# Patient Record
Sex: FEMALE | Race: WHITE | Employment: OTHER | URBAN - METROPOLITAN AREA
[De-identification: names, ages, dates, MRNs, and addresses within clinical notes are randomized per-mention and may not be internally consistent; named-entity substitution may affect disease eponyms.]

---

## 2020-07-07 ENCOUNTER — OFFICE VISIT (OUTPATIENT)
Dept: SURGERY | Age: 65
End: 2020-07-07
Payer: MEDICARE

## 2020-07-07 ENCOUNTER — PREP FOR PROCEDURE (OUTPATIENT)
Dept: SURGERY | Age: 65
End: 2020-07-07

## 2020-07-07 VITALS
BODY MASS INDEX: 14.71 KG/M2 | HEART RATE: 75 BPM | TEMPERATURE: 97.1 F | HEIGHT: 63 IN | DIASTOLIC BLOOD PRESSURE: 70 MMHG | WEIGHT: 83 LBS | SYSTOLIC BLOOD PRESSURE: 112 MMHG | RESPIRATION RATE: 16 BRPM

## 2020-07-07 PROCEDURE — G8400 PT W/DXA NO RESULTS DOC: HCPCS | Performed by: SURGERY

## 2020-07-07 PROCEDURE — 1123F ACP DISCUSS/DSCN MKR DOCD: CPT | Performed by: SURGERY

## 2020-07-07 PROCEDURE — 3017F COLORECTAL CA SCREEN DOC REV: CPT | Performed by: SURGERY

## 2020-07-07 PROCEDURE — 1090F PRES/ABSN URINE INCON ASSESS: CPT | Performed by: SURGERY

## 2020-07-07 PROCEDURE — G8419 CALC BMI OUT NRM PARAM NOF/U: HCPCS | Performed by: SURGERY

## 2020-07-07 PROCEDURE — G8427 DOCREV CUR MEDS BY ELIG CLIN: HCPCS | Performed by: SURGERY

## 2020-07-07 PROCEDURE — 99204 OFFICE O/P NEW MOD 45 MIN: CPT | Performed by: SURGERY

## 2020-07-07 PROCEDURE — 1036F TOBACCO NON-USER: CPT | Performed by: SURGERY

## 2020-07-07 PROCEDURE — 4040F PNEUMOC VAC/ADMIN/RCVD: CPT | Performed by: SURGERY

## 2020-07-07 RX ORDER — DICYCLOMINE HYDROCHLORIDE 10 MG/1
CAPSULE ORAL
COMMUNITY
Start: 2020-06-18

## 2020-07-07 RX ORDER — CYANOCOBALAMIN 1000 UG/ML
INJECTION INTRAMUSCULAR; SUBCUTANEOUS
COMMUNITY

## 2020-07-07 RX ORDER — DICYCLOMINE HYDROCHLORIDE 10 MG/5ML
SOLUTION ORAL
COMMUNITY
Start: 2019-11-10 | End: 2020-07-14

## 2020-07-07 RX ORDER — RIZATRIPTAN BENZOATE 5 MG/1
TABLET ORAL
COMMUNITY
Start: 2019-10-30

## 2020-07-07 RX ORDER — FLUOCINONIDE TOPICAL SOLUTION USP, 0.05% 0.5 MG/ML
SOLUTION TOPICAL
COMMUNITY
End: 2020-07-14

## 2020-07-07 RX ORDER — TERIPARATIDE 250 UG/ML
20 INJECTION, SOLUTION SUBCUTANEOUS DAILY
COMMUNITY
Start: 2020-03-18

## 2020-07-07 RX ORDER — CLINDAMYCIN PHOSPHATE 10 UG/ML
LOTION TOPICAL
COMMUNITY
Start: 2020-06-29 | End: 2020-07-14

## 2020-07-07 RX ORDER — OLANZAPINE 2.5 MG/1
TABLET ORAL
COMMUNITY
Start: 2020-04-22 | End: 2020-07-14

## 2020-07-07 RX ORDER — KETOCONAZOLE 20 MG/ML
SHAMPOO TOPICAL
COMMUNITY
Start: 2020-06-28

## 2020-07-07 RX ORDER — ALPRAZOLAM 2 MG/1
TABLET ORAL
COMMUNITY
Start: 2017-02-06

## 2020-07-07 RX ORDER — IBUPROFEN 800 MG/1
800 TABLET ORAL EVERY 8 HOURS PRN
COMMUNITY
Start: 2014-05-12

## 2020-07-07 RX ORDER — SODIUM CHLORIDE 0.9 % (FLUSH) 0.9 %
10 SYRINGE (ML) INJECTION EVERY 12 HOURS SCHEDULED
Status: CANCELLED | OUTPATIENT
Start: 2020-07-07

## 2020-07-07 RX ORDER — AZELASTINE 1 MG/ML
SPRAY, METERED NASAL
COMMUNITY
Start: 2020-02-11

## 2020-07-07 RX ORDER — B-COMPLEX WITH VITAMIN C
100 TABLET ORAL
COMMUNITY

## 2020-07-07 RX ORDER — BUTALBITAL, ACETAMINOPHEN AND CAFFEINE 50; 325; 40 MG/1; MG/1; MG/1
1 CAPSULE ORAL EVERY 6 HOURS PRN
COMMUNITY
Start: 2011-07-03

## 2020-07-07 RX ORDER — TEMAZEPAM 15 MG/1
CAPSULE ORAL
COMMUNITY
Start: 2020-02-11

## 2020-07-07 RX ORDER — KETOCONAZOLE 20 MG/ML
SHAMPOO TOPICAL
COMMUNITY
End: 2020-07-14

## 2020-07-07 RX ORDER — DONEPEZIL HYDROCHLORIDE 10 MG/1
23 TABLET, FILM COATED ORAL DAILY
COMMUNITY
Start: 2019-05-06

## 2020-07-07 RX ORDER — UREA 10 %
800 LOTION (ML) TOPICAL DAILY
COMMUNITY

## 2020-07-07 RX ORDER — ESTRADIOL 0.1 MG/G
CREAM VAGINAL
COMMUNITY
Start: 2019-12-23

## 2020-07-07 RX ORDER — SODIUM CHLORIDE 0.9 % (FLUSH) 0.9 %
10 SYRINGE (ML) INJECTION PRN
Status: CANCELLED | OUTPATIENT
Start: 2020-07-07

## 2020-07-07 RX ORDER — GUAIFENESIN 1200 MG/1
1200 TABLET, EXTENDED RELEASE ORAL EVERY 12 HOURS
COMMUNITY

## 2020-07-07 RX ORDER — CYANOCOBALAMIN 1000 UG/ML
INJECTION INTRAMUSCULAR; SUBCUTANEOUS
COMMUNITY
Start: 2020-02-11 | End: 2020-07-14

## 2020-07-07 RX ORDER — CITALOPRAM 40 MG/1
40 TABLET ORAL DAILY
COMMUNITY

## 2020-07-07 NOTE — LETTER
P - [facility] [address] [place] [address] [phone]  f [phone]    Marques Alatorre MD                        SURGERY ORDER   -- Time of order -- 20    2:36 PM    Facility:   [facility] # _________________                                                                                    Scheduled By:____________                  Surgery Date & Time: 20 @ 7:15 am                    Pt arrival: 5:30 am                                                                                      Patient Name:  Eula Andino     :  1955     PCP:  Evan Ferrara Ph:    [phone] (home)                                                     PROCEDURE:  Exam Under Anesthesia, disimpaction of rectum under anesthesia, flexible sigmoidoscopy  19027, 03213, 42459  DIAGNOSIS:      ICD-10-CM    1. Impacted stool in rectum ([facility]) K56.41    2.  Ileostomy in place [facility]) Z93.2      Anesthesia: _General    Anesthesia: lines, blocks, TAP/epidural, etc: none  Time Needed:  20 minutes    Pt Position:  prone/daryl-knife    Bowel Prep: enema x 2         Outpatient _x__ Admit ___                  Pre-Op to be done by: __N/A___    Cardiac Clearance Done by: ________    Medications to be stopped 5 days before surgery: _________  Additional / Special Orders:                                                                                                                                                                                                          Marques Alatorre MD

## 2020-07-07 NOTE — PROGRESS NOTES
infusions. Patient's problem list, medications, past medical, surgical, family, and social histories were reviewed and updated in the chart as indicated today. History reviewed. No pertinent past medical history. Surgical history reviewed above. Family History: denies family history of colon cancer    Social History:   Social History     Tobacco Use    Smoking status: Never Smoker    Smokeless tobacco: Never Used   Substance Use Topics    Alcohol use: Not on file      Tobacco cessation counseling provided as appropriate. REVIEW OF SYSTEMS:    Pertinent positives and negatives are mentioned in the HPI above. Otherwise, all other systems were reviewed and negative. Objective:     Physical Exam   /70   Pulse 75   Temp 97.1 °F (36.2 °C) (Tympanic)   Resp 16   Ht 5' 3\" (1.6 m)   Wt 83 lb (37.6 kg)   Breastfeeding No   BMI 14.70 kg/m²   Constitutional: Appears well-developed and well-nourished. Grooming appropriate. No gross deformities. Body mass index is 14.7 kg/m². Eyes: No scleral icterus. Conjunctiva/lids normal. Vision intact grossly. Pupils equal/symmetric, reactive bilaterally. ENT: External ears/nose without defect, scars, or masses. Hearing grossly intact. No facial deformity. Lips normal, normal dentition. Neck: No masses. Trachea midline. No crepitus. Thyroid not enlarged. Cardiovascular: Normal rate. No peripheral edema. Abdominal aorta normal size to palpation. Pulmonary/Chest: Effort normal. No respiratory distress. No wheezes. No use of accessory muscles. Musculoskeletal: Normal range of motion x all 4 extremities and head/neck, without deformity, pain, or crepitus, with normal strength and tone. Normal gait. Nails without clubbing or cyanosis. Neurological: Alert and oriented to person, place, and time. No gross deficits. Sensation intact. Skin: Skin is dry. No rashes noted. No pallor. No induration of nodules. Psychiatric: Normal mood and affect.  Behavior normal. Oriented to person, place, and time. Judgment and insight reasonable. Abdominal/wound: soft, ostomy pink, previous healed midine incision    RECTAL EXAM:    Chaperone/MA present in room during entire exam. Patient was placed in knee-chest position or left side down lateral position depending on preference. Exam table manipulated for proper visualization and lighting. Buttocks spread. Inspection reveals: normal    EBENEZER: hard stool, unable to extract due to pain     anoscopy deferred due to acute pain    Pertinent recent lab and radiology results reviewed. Notes from care everywhere reviewed. CT scan results reviewed. Flex sig report reviewed and discussed with providing physician. Coordination with GI. Last colonoscopy: Flex sig, Dr. Pradeep Ramos, recently      Assessment/Plan:     A/P:  New problem(s): Stool impaction s/p TAC/EI  Established problem(s): pelvic floor dysfunction  Additional workup/treatment planned: EUA, disimpaction  Risk of complications/morbidity: high    Patient is status post total abdominal colectomy with end ileostomy for what sounds like chronic abdominal constipation. She does not want ileostomy reversal, which is reasonable considering she likely has severe intra-abdominal scar from her multiple previous laparotomies. She does have pelvic floor emptying dysfunction which is likely resulted in her fecal impaction of dehydrated stool and mucus. This was unable to be disimpacted under sedation during flexible sigmoidoscopy, so I will plan to take her to the operating room for general anesthesia, disimpaction under anesthesia, flexible sigmoidoscopy. I discussed with her the risks of the procedure, including perforation, recurrence, inability to completely disimpact, need for additional procedures in the future.     We briefly discussed that definitive management may be with transabdominal proctectomy, but in her situation this would be extremely high risk given her multiple previous operations, her malnutrition, and her age. DISPOSITION:  Surgery in coming weeks    My findings will be relayed to consulting practitioner or PCP via Epic    Total encounter time:  45 min with > 50% spent with face-to-face counseling and coordination of care, including: Discussion, counseling, coordination of care as above    Note completed using dictation software, please excuse any errors.     Electronically signed by Lieutenant Paul MD on 7/7/2020 at 2:15 PM

## 2020-07-10 ENCOUNTER — OFFICE VISIT (OUTPATIENT)
Dept: PRIMARY CARE CLINIC | Age: 65
End: 2020-07-10
Payer: MEDICARE

## 2020-07-10 PROCEDURE — G8419 CALC BMI OUT NRM PARAM NOF/U: HCPCS | Performed by: NURSE PRACTITIONER

## 2020-07-10 PROCEDURE — G8428 CUR MEDS NOT DOCUMENT: HCPCS | Performed by: NURSE PRACTITIONER

## 2020-07-10 PROCEDURE — 99211 OFF/OP EST MAY X REQ PHY/QHP: CPT | Performed by: NURSE PRACTITIONER

## 2020-07-13 NOTE — PROGRESS NOTES
effect during my hospitalization, the order may or may not be in effect during this procedure. I give my doctor permission to give me blood or blood products. I understand that there are risks with receiving blood such as hepatitis, AIDS, fever, or allergic reaction. I acknowledge that the risks, benefits, and alternatives of this treatment have been explained to me and that no express or implied warranty has been given by the hospital, any blood bank, or any person or entity as to the blood or blood components transfused. At the discretion of my doctor, I agree to allow observers, equipment/product representatives and allow photographing, and/or televising of the procedure, provided my name or identity is maintained confidentially. I agree the hospital may dispose of or use for scientific or educational purposes any tissue, fluid, or body parts which may be removed.     ________________________________Date________Time______ am/pm  ([employer])  Patient or Signature of Closest Relative or Legal Guardian    ________________________________Date________Time______am/pm      Page 1 of  1  Witness

## 2020-07-14 RX ORDER — CARBAMAZEPINE 100 MG/5ML
200 SUSPENSION ORAL 4 TIMES DAILY
COMMUNITY
Start: 2020-05-22

## 2020-07-14 RX ORDER — OMEPRAZOLE 10 MG/1
10 CAPSULE, DELAYED RELEASE ORAL DAILY
COMMUNITY

## 2020-07-14 NOTE — PROGRESS NOTES
[facility] PRE-SURGICAL TESTING INSTRUCTIONS                              PRIOR TO PROCEDURE DATE:  1. Please follow any guidelines/instructions prior to your procedure as advised by your surgeon. 2. Arrange for someone to drive you home and be with you for the first 24 hours after discharge for your safety after your procedure for which you received sedation. Ensure it is someone we can share information with regarding your discharge. 3. You must contact your surgeon for instructions IF:   You are taking any blood thinners, aspirin, anti-inflammatory or vitamin E.   There is a change in your physical condition such as a cold, fever, rash, cuts, sores or any other infection, especially near your surgical site. 4. Do not drink alcohol the day before or day of your procedure. 5. A Pre-op History and Physical for surgery MUST be completed by your Physician or Urgent Care within 30 days of your procedure date. Please bring a copy with you on the day of your procedure and along with any other testing performed. THE DAY OF YOUR PROCEDURE:  1. Follow instructions for ARRIVAL TIME as DIRECTED BY YOUR SURGEON. I    2. Enter the MAIN entrance from [employer] and follow the signs to the free [employer] or [email or web address] parking (offered free of charge 6am-5pm). 3. Enter the Main Entrance of the hospital (do not enter from the lower level of the parking garage). Upon entrance, check in with the  at the main desk on your left. If no one is available at the desk, proceed into the [facility] Waiting Room and go through the door directly into the [facility]. There is a Check-in desk ACROSS from Room 5 (marked with a sign hanging from the ceiling). The phone number for the surgery center is [phone]. 4. Please call [phone] option #2 option #2 if you have not been preregistered yet. On the day of your procedure bring your insurance card and photo ID.  You will be registered at your bedside once brought back to your room. 5. DO NOT EAT ANYTHING eight hours prior to surgery. May have 8 ounces of water 4 hours prior to surgery. 6. MEDICATIONS    Take the following medications with a SMALL sip of water:    Use your usual dose of inhalers the morning of surgery. BRING your rescue inhaler with you to hospital.    Anesthesia does NOT want you to take insulin the morning of surgery. They will control your blood sugar while you are at the hospital. Please contact your ordering physician for instructions regarding your insulin the night before your procedure. If you have an insulin pump, please keep it set on basal rate. 7. Do not swallow water when brushing teeth. No gum, candy, mints or ice chips. Refrain from smoking or at least decrease the amount. 8. Dress in loose, comfortable clothing appropriate for redressing after your procedure. Do not wear jewelry (including body piercings), make-up (especially NO eye make-up), fingernail polish (NO toenail polish if foot/leg surgery), lotion, powders or metal hairclips. 9. Dentures, glasses, or contacts will need to be removed before your procedure. Bring cases for your glasses, contacts, dentures, or hearing aids to protect them while you are in surgery. 10. If you use a CPAP, please bring it with you on the day of your procedure. 11. We recommend that valuable personal  belongings such as cash, cell phones, e-tablets or jewelry, be left at home during your stay. The hospital will not be responsible for valuables that are not secured in the hospital safe. However, if your insurance requires a co-pay, you may want to bring a method of payment, i.e. Check or credit card, if you wish to pay your co-pay the day of surgery. 12. If you are to stay overnight, you may bring a bag with personal items.  Please have any large items you may need brought in by your family after your arrival to your hospital room.    13. If you have a Living Will or Durable Power of , please bring a copy on the day of your procedure. 15. With your permission, one family member may accompany you while you are being prepared for surgery. Once you are ready, additional family members may join you. HOW WE KEEP YOU SAFE and WORK TO PREVENT SURGICAL SITE INFECTIONS:  1. Health care workers should always check your ID bracelet to verify your name and birth date. You will be asked many times to state your name, date of birth, and allergies. 2. Health care workers should always clean their hands with soap or alcohol gel before providing care to you. It is okay to ask anyone if they cleaned their hands before they touch you. 3. You will be actively involved in verifying the type of procedure you are having and ensuring the correct surgical site. This will be confirmed multiple times prior to your procedure. Do NOT bobby your surgery site UNLESS instructed to by your surgeon. 4. Do not shave or wax for 72 hours prior to procedure near your operative site. Shaving with a razor can irritate your skin and make it easier to develop an infection. On the day of your procedure, any hair that needs to be removed near the surgical site will be clipped by a healthcare worker using a special clippers designed to avoid skin irritation. 5. When you are in the operating room, your surgical site will be cleansed with a special soap, and in most cases, you will be given an antibiotic before the surgery begins. What to expect AFTER YOUR PROCEDURE:  1. Immediately following your procedure, your will be taken to the PACU for the first phase of your recovery. Your nurse will help you recover from any potential side effects of anesthesia, such as extreme drowsiness, changes in your vital signs or breathing patterns. Nausea, headache, muscle aches, or sore throat may also occur after anesthesia.   Your nurse will help you manage these

## 2020-07-15 ENCOUNTER — ANESTHESIA EVENT (OUTPATIENT)
Dept: OPERATING ROOM | Age: 65
End: 2020-07-15
Payer: MEDICARE

## 2020-07-16 ENCOUNTER — HOSPITAL ENCOUNTER (OUTPATIENT)
Age: 65
Setting detail: OUTPATIENT SURGERY
Discharge: HOME | End: 2020-07-16
Attending: SURGERY | Admitting: SURGERY
Payer: MEDICARE

## 2020-07-16 ENCOUNTER — ANESTHESIA (OUTPATIENT)
Dept: OPERATING ROOM | Age: 65
End: 2020-07-16
Payer: MEDICARE

## 2020-07-16 ENCOUNTER — TELEPHONE (OUTPATIENT)
Dept: SURGERY | Age: 65
End: 2020-07-16

## 2020-07-16 VITALS
OXYGEN SATURATION: 96 % | TEMPERATURE: 97.5 F | DIASTOLIC BLOOD PRESSURE: 55 MMHG | RESPIRATION RATE: 19 BRPM | HEIGHT: 63 IN | WEIGHT: 84 LBS | SYSTOLIC BLOOD PRESSURE: 132 MMHG | HEART RATE: 68 BPM | BODY MASS INDEX: 14.88 KG/M2

## 2020-07-16 VITALS — OXYGEN SATURATION: 100 % | DIASTOLIC BLOOD PRESSURE: 62 MMHG | SYSTOLIC BLOOD PRESSURE: 99 MMHG

## 2020-07-16 LAB
EKG ATRIAL RATE: 79 BPM
EKG DIAGNOSIS: NORMAL
EKG P AXIS: 80 DEGREES
EKG P-R INTERVAL: 198 MS
EKG Q-T INTERVAL: 394 MS
EKG QRS DURATION: 72 MS
EKG QTC CALCULATION (BAZETT): 451 MS
EKG R AXIS: 64 DEGREES
EKG T AXIS: 59 DEGREES
EKG VENTRICULAR RATE: 79 BPM
SARS-COV-2: NOT DETECTED
SOURCE: NORMAL

## 2020-07-16 PROCEDURE — 2500000003 HC RX 250 WO HCPCS: Performed by: SURGERY

## 2020-07-16 PROCEDURE — 2580000003 HC RX 258: Performed by: ANESTHESIOLOGY

## 2020-07-16 PROCEDURE — 93010 ELECTROCARDIOGRAM REPORT: CPT | Performed by: INTERNAL MEDICINE

## 2020-07-16 PROCEDURE — 3600000003 HC SURGERY LEVEL 3 BASE: Performed by: SURGERY

## 2020-07-16 PROCEDURE — 7100000011 HC PHASE II RECOVERY - ADDTL 15 MIN: Performed by: SURGERY

## 2020-07-16 PROCEDURE — 93005 ELECTROCARDIOGRAM TRACING: CPT | Performed by: SURGERY

## 2020-07-16 PROCEDURE — 6360000002 HC RX W HCPCS: Performed by: NURSE ANESTHETIST, CERTIFIED REGISTERED

## 2020-07-16 PROCEDURE — 7100000010 HC PHASE II RECOVERY - FIRST 15 MIN: Performed by: SURGERY

## 2020-07-16 PROCEDURE — 3700000001 HC ADD 15 MINUTES (ANESTHESIA): Performed by: SURGERY

## 2020-07-16 PROCEDURE — 7100000001 HC PACU RECOVERY - ADDTL 15 MIN: Performed by: SURGERY

## 2020-07-16 PROCEDURE — 6360000002 HC RX W HCPCS: Performed by: SURGERY

## 2020-07-16 PROCEDURE — 2709999900 HC NON-CHARGEABLE SUPPLY: Performed by: SURGERY

## 2020-07-16 PROCEDURE — 3700000000 HC ANESTHESIA ATTENDED CARE: Performed by: SURGERY

## 2020-07-16 PROCEDURE — 2500000003 HC RX 250 WO HCPCS: Performed by: NURSE ANESTHETIST, CERTIFIED REGISTERED

## 2020-07-16 PROCEDURE — 3600000013 HC SURGERY LEVEL 3 ADDTL 15MIN: Performed by: SURGERY

## 2020-07-16 PROCEDURE — 45915 REMOVE RECTAL OBSTRUCTION: CPT | Performed by: SURGERY

## 2020-07-16 PROCEDURE — 2580000003 HC RX 258: Performed by: SURGERY

## 2020-07-16 PROCEDURE — 7100000000 HC PACU RECOVERY - FIRST 15 MIN: Performed by: SURGERY

## 2020-07-16 RX ORDER — SODIUM CHLORIDE 0.9 % (FLUSH) 0.9 %
10 SYRINGE (ML) INJECTION PRN
Status: DISCONTINUED | OUTPATIENT
Start: 2020-07-16 | End: 2020-07-16 | Stop reason: HOSPADM

## 2020-07-16 RX ORDER — SUCCINYLCHOLINE/SOD CL,ISO/PF 200MG/10ML
SYRINGE (ML) INTRAVENOUS PRN
Status: DISCONTINUED | OUTPATIENT
Start: 2020-07-16 | End: 2020-07-16 | Stop reason: SDUPTHER

## 2020-07-16 RX ORDER — HEPARIN SODIUM (PORCINE) LOCK FLUSH IV SOLN 100 UNIT/ML 100 UNIT/ML
500 SOLUTION INTRAVENOUS PRN
Status: DISCONTINUED | OUTPATIENT
Start: 2020-07-16 | End: 2020-07-16 | Stop reason: HOSPADM

## 2020-07-16 RX ORDER — FENTANYL CITRATE 50 UG/ML
50 INJECTION, SOLUTION INTRAMUSCULAR; INTRAVENOUS EVERY 5 MIN PRN
Status: DISCONTINUED | OUTPATIENT
Start: 2020-07-16 | End: 2020-07-16 | Stop reason: HOSPADM

## 2020-07-16 RX ORDER — SODIUM CHLORIDE, SODIUM LACTATE, POTASSIUM CHLORIDE, CALCIUM CHLORIDE 600; 310; 30; 20 MG/100ML; MG/100ML; MG/100ML; MG/100ML
INJECTION, SOLUTION INTRAVENOUS CONTINUOUS
Status: DISCONTINUED | OUTPATIENT
Start: 2020-07-16 | End: 2020-07-16 | Stop reason: HOSPADM

## 2020-07-16 RX ORDER — PROMETHAZINE HYDROCHLORIDE 25 MG/ML
6.25 INJECTION, SOLUTION INTRAMUSCULAR; INTRAVENOUS
Status: DISCONTINUED | OUTPATIENT
Start: 2020-07-16 | End: 2020-07-16 | Stop reason: HOSPADM

## 2020-07-16 RX ORDER — MAGNESIUM HYDROXIDE 1200 MG/15ML
LIQUID ORAL CONTINUOUS PRN
Status: COMPLETED | OUTPATIENT
Start: 2020-07-16 | End: 2020-07-16

## 2020-07-16 RX ORDER — LABETALOL 20 MG/4 ML (5 MG/ML) INTRAVENOUS SYRINGE
5 EVERY 10 MIN PRN
Status: DISCONTINUED | OUTPATIENT
Start: 2020-07-16 | End: 2020-07-16 | Stop reason: HOSPADM

## 2020-07-16 RX ORDER — SODIUM CHLORIDE 0.9 % (FLUSH) 0.9 %
10 SYRINGE (ML) INJECTION EVERY 12 HOURS SCHEDULED
Status: DISCONTINUED | OUTPATIENT
Start: 2020-07-16 | End: 2020-07-16 | Stop reason: HOSPADM

## 2020-07-16 RX ORDER — LIDOCAINE HYDROCHLORIDE 20 MG/ML
INJECTION, SOLUTION INTRAVENOUS PRN
Status: DISCONTINUED | OUTPATIENT
Start: 2020-07-16 | End: 2020-07-16 | Stop reason: SDUPTHER

## 2020-07-16 RX ORDER — ONDANSETRON 2 MG/ML
INJECTION INTRAMUSCULAR; INTRAVENOUS PRN
Status: DISCONTINUED | OUTPATIENT
Start: 2020-07-16 | End: 2020-07-16 | Stop reason: SDUPTHER

## 2020-07-16 RX ORDER — DEXAMETHASONE SODIUM PHOSPHATE 4 MG/ML
INJECTION, SOLUTION INTRA-ARTICULAR; INTRALESIONAL; INTRAMUSCULAR; INTRAVENOUS; SOFT TISSUE PRN
Status: DISCONTINUED | OUTPATIENT
Start: 2020-07-16 | End: 2020-07-16 | Stop reason: SDUPTHER

## 2020-07-16 RX ORDER — FENTANYL CITRATE 50 UG/ML
INJECTION, SOLUTION INTRAMUSCULAR; INTRAVENOUS PRN
Status: DISCONTINUED | OUTPATIENT
Start: 2020-07-16 | End: 2020-07-16 | Stop reason: SDUPTHER

## 2020-07-16 RX ORDER — HYDRALAZINE HYDROCHLORIDE 20 MG/ML
5 INJECTION INTRAMUSCULAR; INTRAVENOUS EVERY 10 MIN PRN
Status: DISCONTINUED | OUTPATIENT
Start: 2020-07-16 | End: 2020-07-16 | Stop reason: HOSPADM

## 2020-07-16 RX ORDER — BUPIVACAINE HYDROCHLORIDE AND EPINEPHRINE 5; 5 MG/ML; UG/ML
INJECTION, SOLUTION EPIDURAL; INTRACAUDAL; PERINEURAL PRN
Status: DISCONTINUED | OUTPATIENT
Start: 2020-07-16 | End: 2020-07-16 | Stop reason: ALTCHOICE

## 2020-07-16 RX ORDER — FENTANYL CITRATE 50 UG/ML
25 INJECTION, SOLUTION INTRAMUSCULAR; INTRAVENOUS EVERY 5 MIN PRN
Status: DISCONTINUED | OUTPATIENT
Start: 2020-07-16 | End: 2020-07-16 | Stop reason: HOSPADM

## 2020-07-16 RX ORDER — PROPOFOL 10 MG/ML
INJECTION, EMULSION INTRAVENOUS PRN
Status: DISCONTINUED | OUTPATIENT
Start: 2020-07-16 | End: 2020-07-16 | Stop reason: SDUPTHER

## 2020-07-16 RX ORDER — SODIUM CHLORIDE 9 MG/ML
INJECTION, SOLUTION INTRAVENOUS CONTINUOUS
Status: DISCONTINUED | OUTPATIENT
Start: 2020-07-16 | End: 2020-07-16 | Stop reason: HOSPADM

## 2020-07-16 RX ORDER — ONDANSETRON 4 MG/1
8 TABLET, FILM COATED ORAL EVERY 8 HOURS PRN
Qty: 18 TABLET | Refills: 0 | Status: SHIPPED | OUTPATIENT
Start: 2020-07-16 | End: 2020-07-19

## 2020-07-16 RX ORDER — ONDANSETRON 2 MG/ML
4 INJECTION INTRAMUSCULAR; INTRAVENOUS
Status: DISCONTINUED | OUTPATIENT
Start: 2020-07-16 | End: 2020-07-16 | Stop reason: HOSPADM

## 2020-07-16 RX ADMIN — FENTANYL CITRATE 50 MCG: 50 INJECTION INTRAMUSCULAR; INTRAVENOUS at 07:30

## 2020-07-16 RX ADMIN — FAMOTIDINE 20 MG: 10 INJECTION, SOLUTION INTRAVENOUS at 07:35

## 2020-07-16 RX ADMIN — HEPARIN SODIUM (PORCINE) LOCK FLUSH IV SOLN 100 UNIT/ML 500 UNITS: 100 SOLUTION at 09:40

## 2020-07-16 RX ADMIN — ONDANSETRON 4 MG: 2 INJECTION INTRAMUSCULAR; INTRAVENOUS at 07:35

## 2020-07-16 RX ADMIN — LIDOCAINE HYDROCHLORIDE 100 MG: 20 INJECTION, SOLUTION INTRAVENOUS at 07:19

## 2020-07-16 RX ADMIN — SODIUM CHLORIDE, SODIUM LACTATE, POTASSIUM CHLORIDE, AND CALCIUM CHLORIDE: 600; 310; 30; 20 INJECTION, SOLUTION INTRAVENOUS at 07:15

## 2020-07-16 RX ADMIN — Medication 180 MG: at 07:19

## 2020-07-16 RX ADMIN — PROPOFOL 200 MG: 10 INJECTION, EMULSION INTRAVENOUS at 07:19

## 2020-07-16 RX ADMIN — DEXAMETHASONE SODIUM PHOSPHATE 8 MG: 4 INJECTION, SOLUTION INTRAMUSCULAR; INTRAVENOUS at 07:35

## 2020-07-16 ASSESSMENT — PULMONARY FUNCTION TESTS
PIF_VALUE: 15
PIF_VALUE: 12
PIF_VALUE: 14
PIF_VALUE: 15
PIF_VALUE: 0
PIF_VALUE: 15
PIF_VALUE: 32
PIF_VALUE: 15
PIF_VALUE: 17
PIF_VALUE: 15
PIF_VALUE: 1
PIF_VALUE: 15
PIF_VALUE: 1
PIF_VALUE: 15
PIF_VALUE: 17
PIF_VALUE: 1
PIF_VALUE: 15
PIF_VALUE: 10
PIF_VALUE: 13
PIF_VALUE: 15
PIF_VALUE: 2
PIF_VALUE: 12
PIF_VALUE: 14
PIF_VALUE: 15
PIF_VALUE: 11
PIF_VALUE: 3
PIF_VALUE: 26
PIF_VALUE: 15
PIF_VALUE: 9
PIF_VALUE: 2
PIF_VALUE: 15
PIF_VALUE: 14
PIF_VALUE: 12
PIF_VALUE: 27
PIF_VALUE: 14
PIF_VALUE: 15
PIF_VALUE: 1
PIF_VALUE: 0
PIF_VALUE: 23
PIF_VALUE: 15
PIF_VALUE: 15
PIF_VALUE: 26
PIF_VALUE: 8

## 2020-07-16 ASSESSMENT — PAIN DESCRIPTION - DESCRIPTORS: DESCRIPTORS: ACHING;CONSTANT;DISCOMFORT;DULL

## 2020-07-16 ASSESSMENT — PAIN SCALES - GENERAL: PAINLEVEL_OUTOF10: 0

## 2020-07-16 ASSESSMENT — PAIN - FUNCTIONAL ASSESSMENT: PAIN_FUNCTIONAL_ASSESSMENT: 0-10
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
## 2020-07-16 NOTE — OP NOTE
OPERATIVE NOTE     Lavelle Salmeron  1955  [id number]    DATE OF PROCEDURE: 07/16/20     PREOPERATIVE DIAGNOSES: Rectal impaction     POSTOPERATIVE DIAGNOSES: same     PROCEDURE: Disimpaction under general anesthesia     SURGEON: Amanuel Sesay MD    ASSISTANTFekaci Zhou, PGY-2, resident     ANESTHESIA: GET. COMPLICATIONS: None. EBL: 5 cc    SPECIMEN: none     FINDINGS: impaction of dehydrated stool. INDICATIONS: The patient is a 12-year-old female who has had symptoms of rectal impaction. Patient was recommended to undergo EUA, disimpaction under anesthesia. The risks, benefits, and alternatives of procedure were explained to the patient including risks of bleeding, infection, pelvic sepsis, urinary retention, anal stenosis, and potential  sphincter damage and dysfunction. Patient understood all of these risks and agreed to  proceed. Typical postoperative course was discussed, including pain and likely need for up to 3 weeks of recovery. PROCEDURE DETAILS:   The patient was brought to the operating theater. The patient was placed in the prone daryl-knife position after induction of anesthesia. Buttocks were taped apart carefully using tape. The patient's perianal region was prepped and draped in usual sterile fashion using Betadine prep solution. Time-out was performed confirming the patient's identity as well as the operative site. Antibiotics were not indicated. SCDs were on and functioning. All safety points were followed. Digital rectal exam and anoscopy was performed in all 4 quadrants using lighted anal retractor, noting large severe impaction of dehydrated stool. Manual disimpaction performed digitally for several minutes until all stool cleared from rectal vault. Flexible sigmoidoscopy was performed up to 15 cm, to the staple line. The mucosa was visualized  with air insufflation. Findings include: no large masses/polyps. The sigmoidoscope was removed.      Anoscopy was then performed again, wounds were irrigated, confirming complete hemostasis. 20 cc of local anesthetic was injected for perianal nerve block. The patient tolerated the procedure well, was woken up and brought to the PACU in stable condition. All counts were correct x2 at the end of the procedure. No complications. Yonny Mejias MD    Electronically signed by Yesenia Coates MD on 7/16/2020 at 7:52 AM

## 2020-07-16 NOTE — ANESTHESIA POSTPROCEDURE EVALUATION
Department of Anesthesiology  Postprocedure Note    Patient: Yoon Sarabia  MRN: [id number]  YOB: 1955  Date of evaluation: 7/16/2020  Time:  7:06 PM     Procedure Summary     Date:  07/16/20 Room / Location:  [address] [place] [address] / [address]    Anesthesia Start:  0715 Anesthesia Stop:  0802    Procedures:       EXAM UNDER ANESTHESIA, DISIMPACTION OF RECTUM UNDER ANESTHESIA/ (N/A Anus)      FLEXIBLE SIGMOIDOSCOPY (N/A Anus) Diagnosis:       Impacted stool in rectum ([address]      Ileostomy in place ([facility]      (Impacted stool in rectum ([facility] [address] [K56.41] Ileostomy in place [facility]) [Z93.2])    Surgeon:  Francisco West MD Responsible Provider:  Salty Callahan DO    Anesthesia Type:  general ASA Status:  3          Anesthesia Type: general    Lorena Phase I: Lorena Score: 10    Lornea Phase II: Lorena Score: 10    Last vitals: Reviewed and per EMR flowsheets.        Anesthesia Post Evaluation    Patient location during evaluation: PACU  Patient participation: complete - patient participated  Level of consciousness: awake and alert  Airway patency: patent  Nausea & Vomiting: no nausea and no vomiting  Cardiovascular status: blood pressure returned to baseline  Respiratory status: acceptable  Hydration status: euvolemic

## 2020-07-16 NOTE — PROGRESS NOTES
Ambulatory Surgery/Procedure Discharge Note    Vitals:    07/16/20 0919   BP: (!) 132/55   Pulse: 68   Resp:    Temp: 97.5 °F (36.4 °C)   SpO2: 96%       In: 670 [P.O.:120; I.V.:550]  Out: 250 [Urine:250]    Restroom use offered before discharge. Yes/voided X2    Pain assessment:  none  Pain Level: 0        Scant red drainage when she used diaper wipe to celanese self after voiding. Discharge instructions reviewed. Patient and [facility] on phone educated, using the teach back method, about follow up instructions and discharge instructions. A completed copy of the AVS instructions given to patient and all questions answered. Walking in room with a steady gait. Home with sitz bath with instructions how to use. Ostomy emptied with seedy brown green liquid. BP within 20% of pre op          Patient discharged to home/self care.  Patient discharged via wheel chair by Dylan Fair to waiting family/S.O.       7/16/2020 9:22 AM

## 2020-07-16 NOTE — BRIEF OP NOTE
Brief Postoperative Note      Patient: Sheldon Ramírez  YOB: 1955  MRN: [id number]    Date of Procedure: 7/16/2020    Pre-Op Diagnosis: Impacted stool in rectum ([facility] [address] [K56.41] Ileostomy in place ([facility] [address] [Z93.2]    Post-Op Diagnosis: Same       Procedure(s):  EXAM UNDER ANESTHESIA, DISIMPACTION OF RECTUM UNDER ANESTHESIA/  FLEXIBLE SIGMOIDOSCOPY    Surgeon(s):  Amanuel Sesay MD    Assistant:  Resident: Aspen Mariano MD    Anesthesia: General    Estimated Blood Loss (mL): Minimal    Complications: None    Findings:   Manual disimpaction of retained rectal stool. Flex sig. Injection of local anesthetic.      Plan:  Discharge home from Same Day    Electronically signed by Aspen Mariano MD on 7/16/2020 at 7:50 AM
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
## 2020-07-16 NOTE — PROGRESS NOTES
PACU Transfer to [facility]    Vitals:    07/16/20 0854   BP: 131/63 @0845   Pulse: 74   Resp: 19   Temp: 97.2   SpO2: 98%         Intake/Output Summary (Last 24 hours) at 7/16/2020 0906  Last data filed at 7/16/2020 0854  Gross per 24 hour   Intake 670 ml   Output 250 ml   Net 420 ml       Pain assessment:  present - adequately treated  Pain Level: 5    Patient transferred to care of KRISTEN RN.    7/16/2020 9:06 AM

## 2020-07-17 NOTE — TELEPHONE ENCOUNTER
The patient called to cancel the message below. She said that her instructions were in my chart and she was able to get all of the information she needed. I will close this phone note.

## 2021-07-29 ENCOUNTER — HOSPITAL ENCOUNTER (OUTPATIENT)
Dept: GENERAL RADIOLOGY | Age: 66
Discharge: HOME | End: 2021-07-29
Payer: MEDICARE

## 2021-07-29 DIAGNOSIS — E46 PROTEIN-CALORIE MALNUTRITION, UNSPECIFIED SEVERITY (HCC): ICD-10-CM

## 2021-07-29 DIAGNOSIS — R14.0 BLOATING: ICD-10-CM

## 2021-07-29 DIAGNOSIS — R10.9 ACUTE ABDOMINAL PAIN: ICD-10-CM

## 2021-07-29 PROCEDURE — 74250 X-RAY XM SM INT 1CNTRST STD: CPT

## 2024-04-04 ENCOUNTER — APPOINTMENT (OUTPATIENT)
Dept: CT IMAGING | Age: 69
DRG: 369 | End: 2024-04-04
Payer: MEDICARE

## 2024-04-04 ENCOUNTER — HOSPITAL ENCOUNTER (INPATIENT)
Age: 69
LOS: 8 days | Discharge: NURSING, REHAB OR LONG-TERM CARE | DRG: 369 | End: 2024-04-13
Attending: EMERGENCY MEDICINE | Admitting: FAMILY MEDICINE
Payer: MEDICARE

## 2024-04-04 DIAGNOSIS — R63.0 POOR APPETITE: ICD-10-CM

## 2024-04-04 DIAGNOSIS — E87.6 HYPOKALEMIA: ICD-10-CM

## 2024-04-04 DIAGNOSIS — R11.2 NAUSEA AND VOMITING, UNSPECIFIED VOMITING TYPE: ICD-10-CM

## 2024-04-04 DIAGNOSIS — R10.9 ABDOMINAL PAIN, UNSPECIFIED ABDOMINAL LOCATION: Primary | ICD-10-CM

## 2024-04-04 DIAGNOSIS — E83.42 HYPOMAGNESEMIA: ICD-10-CM

## 2024-04-04 LAB
ALBUMIN SERPL-MCNC: 3.6 G/DL (ref 3.4–5)
ALP SERPL-CCNC: 119 U/L (ref 40–129)
ALT SERPL-CCNC: 26 U/L (ref 10–40)
ANION GAP SERPL CALCULATED.3IONS-SCNC: 19 MMOL/L (ref 3–16)
AST SERPL-CCNC: 30 U/L (ref 15–37)
BACTERIA URNS QL MICRO: ABNORMAL /HPF
BASOPHILS # BLD: 0.1 K/UL (ref 0–0.2)
BASOPHILS NFR BLD: 0.7 %
BILIRUB DIRECT SERPL-MCNC: <0.2 MG/DL (ref 0–0.3)
BILIRUB INDIRECT SERPL-MCNC: ABNORMAL MG/DL (ref 0–1)
BILIRUB SERPL-MCNC: 0.4 MG/DL (ref 0–1)
BILIRUB UR QL STRIP.AUTO: NEGATIVE
BUN SERPL-MCNC: 17 MG/DL (ref 7–20)
CALCIUM SERPL-MCNC: 9.1 MG/DL (ref 8.3–10.6)
CHLORIDE SERPL-SCNC: 96 MMOL/L (ref 99–110)
CLARITY UR: CLEAR
CO2 SERPL-SCNC: 22 MMOL/L (ref 21–32)
COLOR UR: YELLOW
CREAT SERPL-MCNC: <0.5 MG/DL (ref 0.6–1.2)
DEPRECATED RDW RBC AUTO: 12.8 % (ref 12.4–15.4)
EOSINOPHIL # BLD: 0 K/UL (ref 0–0.6)
EOSINOPHIL NFR BLD: 0.6 %
EPI CELLS #/AREA URNS HPF: ABNORMAL /HPF (ref 0–5)
GFR SERPLBLD CREATININE-BSD FMLA CKD-EPI: >90 ML/MIN/{1.73_M2}
GLUCOSE BLD-MCNC: 123 MG/DL (ref 70–99)
GLUCOSE SERPL-MCNC: 45 MG/DL (ref 70–99)
GLUCOSE UR STRIP.AUTO-MCNC: NEGATIVE MG/DL
HCT VFR BLD AUTO: 44 % (ref 36–48)
HGB BLD-MCNC: 15.2 G/DL (ref 12–16)
HGB UR QL STRIP.AUTO: NEGATIVE
KETONES UR STRIP.AUTO-MCNC: >=80 MG/DL
LACTATE BLDV-SCNC: 1.3 MMOL/L (ref 0.4–2)
LEUKOCYTE ESTERASE UR QL STRIP.AUTO: ABNORMAL
LIPASE SERPL-CCNC: 11 U/L (ref 13–60)
LYMPHOCYTES # BLD: 1.3 K/UL (ref 1–5.1)
LYMPHOCYTES NFR BLD: 18.4 %
MAGNESIUM SERPL-MCNC: 1.7 MG/DL (ref 1.8–2.4)
MCH RBC QN AUTO: 33.6 PG (ref 26–34)
MCHC RBC AUTO-ENTMCNC: 34.6 G/DL (ref 31–36)
MCV RBC AUTO: 97.2 FL (ref 80–100)
MONOCYTES # BLD: 1 K/UL (ref 0–1.3)
MONOCYTES NFR BLD: 14.1 %
NEUTROPHILS # BLD: 4.7 K/UL (ref 1.7–7.7)
NEUTROPHILS NFR BLD: 66.2 %
NITRITE UR QL STRIP.AUTO: POSITIVE
PERFORMED ON: ABNORMAL
PH UR STRIP.AUTO: 6 [PH] (ref 5–8)
PLATELET # BLD AUTO: 172 K/UL (ref 135–450)
PMV BLD AUTO: 10.3 FL (ref 5–10.5)
POTASSIUM SERPL-SCNC: 3.4 MMOL/L (ref 3.5–5.1)
PROT SERPL-MCNC: 6.2 G/DL (ref 6.4–8.2)
PROT UR STRIP.AUTO-MCNC: NEGATIVE MG/DL
RBC # BLD AUTO: 4.52 M/UL (ref 4–5.2)
RBC #/AREA URNS HPF: ABNORMAL /HPF (ref 0–4)
SODIUM SERPL-SCNC: 137 MMOL/L (ref 136–145)
SP GR UR STRIP.AUTO: 1.02 (ref 1–1.03)
UA COMPLETE W REFLEX CULTURE PNL UR: ABNORMAL
UA DIPSTICK W REFLEX MICRO PNL UR: YES
URN SPEC COLLECT METH UR: ABNORMAL
UROBILINOGEN UR STRIP-ACNC: 0.2 E.U./DL
WBC # BLD AUTO: 7 K/UL (ref 4–11)
WBC #/AREA URNS HPF: ABNORMAL /HPF (ref 0–5)

## 2024-04-04 PROCEDURE — 87186 SC STD MICRODIL/AGAR DIL: CPT

## 2024-04-04 PROCEDURE — 87086 URINE CULTURE/COLONY COUNT: CPT

## 2024-04-04 PROCEDURE — 85025 COMPLETE CBC W/AUTO DIFF WBC: CPT

## 2024-04-04 PROCEDURE — 36415 COLL VENOUS BLD VENIPUNCTURE: CPT

## 2024-04-04 PROCEDURE — 82010 KETONE BODYS QUAN: CPT

## 2024-04-04 PROCEDURE — 80048 BASIC METABOLIC PNL TOTAL CA: CPT

## 2024-04-04 PROCEDURE — 2580000003 HC RX 258: Performed by: STUDENT IN AN ORGANIZED HEALTH CARE EDUCATION/TRAINING PROGRAM

## 2024-04-04 PROCEDURE — 81001 URINALYSIS AUTO W/SCOPE: CPT

## 2024-04-04 PROCEDURE — 96375 TX/PRO/DX INJ NEW DRUG ADDON: CPT

## 2024-04-04 PROCEDURE — 83735 ASSAY OF MAGNESIUM: CPT

## 2024-04-04 PROCEDURE — 74177 CT ABD & PELVIS W/CONTRAST: CPT

## 2024-04-04 PROCEDURE — 6370000000 HC RX 637 (ALT 250 FOR IP): Performed by: STUDENT IN AN ORGANIZED HEALTH CARE EDUCATION/TRAINING PROGRAM

## 2024-04-04 PROCEDURE — 83690 ASSAY OF LIPASE: CPT

## 2024-04-04 PROCEDURE — 87077 CULTURE AEROBIC IDENTIFY: CPT

## 2024-04-04 PROCEDURE — 80076 HEPATIC FUNCTION PANEL: CPT

## 2024-04-04 PROCEDURE — 83605 ASSAY OF LACTIC ACID: CPT

## 2024-04-04 PROCEDURE — 99285 EMERGENCY DEPT VISIT HI MDM: CPT

## 2024-04-04 PROCEDURE — 6360000004 HC RX CONTRAST MEDICATION: Performed by: STUDENT IN AN ORGANIZED HEALTH CARE EDUCATION/TRAINING PROGRAM

## 2024-04-04 PROCEDURE — 6360000002 HC RX W HCPCS: Performed by: STUDENT IN AN ORGANIZED HEALTH CARE EDUCATION/TRAINING PROGRAM

## 2024-04-04 PROCEDURE — 96365 THER/PROPH/DIAG IV INF INIT: CPT

## 2024-04-04 RX ORDER — OXYCODONE HYDROCHLORIDE 5 MG/1
5 TABLET ORAL ONCE
Status: COMPLETED | OUTPATIENT
Start: 2024-04-04 | End: 2024-04-04

## 2024-04-04 RX ORDER — ONDANSETRON 2 MG/ML
8 INJECTION INTRAMUSCULAR; INTRAVENOUS ONCE
Status: COMPLETED | OUTPATIENT
Start: 2024-04-04 | End: 2024-04-04

## 2024-04-04 RX ORDER — MAGNESIUM SULFATE IN WATER 40 MG/ML
2000 INJECTION, SOLUTION INTRAVENOUS PRN
Status: DISCONTINUED | OUTPATIENT
Start: 2024-04-04 | End: 2024-04-07

## 2024-04-04 RX ORDER — DEXTROSE, SODIUM CHLORIDE, SODIUM LACTATE, POTASSIUM CHLORIDE, AND CALCIUM CHLORIDE 5; .6; .31; .03; .02 G/100ML; G/100ML; G/100ML; G/100ML; G/100ML
1000 INJECTION, SOLUTION INTRAVENOUS CONTINUOUS
Status: DISCONTINUED | OUTPATIENT
Start: 2024-04-04 | End: 2024-04-05

## 2024-04-04 RX ORDER — SODIUM CHLORIDE, SODIUM GLUCONATE, SODIUM ACETATE, POTASSIUM CHLORIDE AND MAGNESIUM CHLORIDE 526; 502; 368; 37; 30 MG/100ML; MG/100ML; MG/100ML; MG/100ML; MG/100ML
500 INJECTION, SOLUTION INTRAVENOUS ONCE
Status: COMPLETED | OUTPATIENT
Start: 2024-04-04 | End: 2024-04-04

## 2024-04-04 RX ORDER — ACETAMINOPHEN 325 MG/1
650 TABLET ORAL ONCE
Status: COMPLETED | OUTPATIENT
Start: 2024-04-04 | End: 2024-04-04

## 2024-04-04 RX ADMIN — IOPAMIDOL 75 ML: 755 INJECTION, SOLUTION INTRAVENOUS at 21:26

## 2024-04-04 RX ADMIN — OXYCODONE 5 MG: 5 TABLET ORAL at 20:52

## 2024-04-04 RX ADMIN — ONDANSETRON 8 MG: 2 INJECTION INTRAMUSCULAR; INTRAVENOUS at 20:55

## 2024-04-04 RX ADMIN — SODIUM CHLORIDE, SODIUM GLUCONATE, SODIUM ACETATE, POTASSIUM CHLORIDE AND MAGNESIUM CHLORIDE 500 ML: 526; 502; 368; 37; 30 INJECTION, SOLUTION INTRAVENOUS at 21:11

## 2024-04-04 RX ADMIN — LIDOCAINE HYDROCHLORIDE: 20 SOLUTION ORAL at 20:53

## 2024-04-04 RX ADMIN — ACETAMINOPHEN 650 MG: 325 TABLET ORAL at 20:51

## 2024-04-04 ASSESSMENT — PAIN DESCRIPTION - ORIENTATION: ORIENTATION: LEFT

## 2024-04-04 ASSESSMENT — PAIN DESCRIPTION - LOCATION: LOCATION: ABDOMEN

## 2024-04-04 ASSESSMENT — PAIN DESCRIPTION - PAIN TYPE: TYPE: ACUTE PAIN

## 2024-04-04 ASSESSMENT — PAIN SCALES - GENERAL: PAINLEVEL_OUTOF10: 9

## 2024-04-04 ASSESSMENT — PAIN - FUNCTIONAL ASSESSMENT: PAIN_FUNCTIONAL_ASSESSMENT: 0-10

## 2024-04-04 ASSESSMENT — LIFESTYLE VARIABLES
HOW OFTEN DO YOU HAVE A DRINK CONTAINING ALCOHOL: NEVER
HOW MANY STANDARD DRINKS CONTAINING ALCOHOL DO YOU HAVE ON A TYPICAL DAY: PATIENT DOES NOT DRINK

## 2024-04-04 ASSESSMENT — PAIN DESCRIPTION - DESCRIPTORS: DESCRIPTORS: ACHING

## 2024-04-04 NOTE — ED PROVIDER NOTES
7/16/2020).  Her family history is not on file.  She reports that she has never smoked. She has never used smokeless tobacco. She reports that she does not currently use alcohol after a past usage of about 1.0 standard drink of alcohol per week. She reports that she does not use drugs.    Medications     Previous Medications    ALPRAZOLAM (XANAX) 2 MG TABLET    ALPRAZOLAM 2 MG TABS    AZELASTINE (ASTELIN) 0.1 % NASAL SPRAY    AZELASTINE HCL SOLN    BUTALBITAL-APAP-CAFFEINE -40 MG CAPS    Take 1 capsule by mouth every 6 hours as needed    CALCIUM CARBONATE-VITAMIN D 500-400 MG-UNIT TABS    Take 1,000 mg by mouth    CARBAMAZEPINE (TEGRETOL PO)    Take by mouth Oral suspension    CARBAMAZEPINE (TEGRETOL) 100 MG/5ML SUSPENSION    Take 200 mg by mouth 4 times daily    CITALOPRAM (CELEXA) 40 MG TABLET    Take 40 mg by mouth daily    CYANOCOBALAMIN 1000 MCG/ML INJECTION    by Injection route every 21 days.    DICYCLOMINE (BENTYL) 10 MG CAPSULE    TAKE 1 CAPSULE BY MOUTH THREE TIMES A DAY    DONEPEZIL (ARICEPT) 10 MG TABLET    Take 23 mg by mouth daily    ESTRADIOL (ESTRACE) 0.1 MG/GM VAGINAL CREAM    1 g PV daily x 2weeks then 1g PV 2-3x/week thereafter Indications: Atrophic Vaginitis associated with Menopause    FEXOFENADINE HCL (ALLEGRA ALLERGY PO)    Take by mouth 2 times daily    FLUOCINONIDE EX    by NOT APPLICABLE route daily as needed    FOLIC ACID (FOLVITE) 800 MCG TABLET    Take 800 mcg by mouth daily    GUAIFENESIN 1200 MG TB12    Take 1,200 mg by mouth every 12 hours    IBUPROFEN (ADVIL;MOTRIN) 800 MG TABLET    Take 800 mg by mouth every 8 hours as needed    KETOCONAZOLE (NIZORAL) 2 % SHAMPOO    SHAMPOO 2 3 TIMES WEEKLY. MASSAGE GENTLY AND LEAVE ON FOR 5 MINUTES BEFORE WASHING OUT.    MOMETASONE FUROATE (ASMANEX, 120 METERED DOSES, IN)    Inhale 2 puffs into the lungs 2 times daily    OMEPRAZOLE (PRILOSEC) 10 MG DELAYED RELEASE CAPSULE    Take 10 mg by mouth daily    POTASSIUM 99 MG TABS    Take 99 mg by  mouth 2 times daily    RIZATRIPTAN (MAXALT) 5 MG TABLET    1-2 tabs prn for acute headaches.May repeat in 2 hours if needed.  Max: 20mg/day    TEMAZEPAM (RESTORIL) 15 MG CAPSULE    TEMAZEPAM 15 MG CAPS    TERIPARATIDE, RECOMBINANT, 620 MCG/2.48ML SOPN    Inject 20 mcg into the skin daily    TRETINOIN (RETIN-A) 0.025 % CREAM    APPLY AT BEDTIME AS DIRECTED    ZINC 100 MG TABS    Take 100 mg by mouth three times a week       Allergies     She is allergic to albuterol, dilaudid  [hydromorphone hcl], hydrocodone-acetaminophen, meperidine, meperidine hcl, morphine, milk (cow), shellfish-derived products, versed [midazolam], and nsaids.    Physical Exam     INITIAL VITALS: BP: 123/69, Temp: 99.1 °F (37.3 °C), Pulse: 73, Respirations: 18, SpO2: 98 %     Gen: NAD, in bed comfortably, non-diaphoretic   Head/Eyes: Atraumatic. PERRL. EOMI bilaterally. No conjunctival injection or scleral icterus   Cardiovascular: RRR no murmurs, rubs, or gallops.   Pulmonary:Lungs CTAB, no rales, wheezes, or ronchi   Abdominal: Soft.  Mild tenderness noted mainly over the left lower quadrant.  Ileostomy site appears pink with soft stool in bag.  No surrounding erythema or warmth.  No peritoneal sign.  No CVA tenderness.  No suprapubic tenderness.  MSK: no obvious long bone deformity.   Skin:  Warm, dry. No rashes, ecchymosis or cyanosis.  Neuro: Alert and oriented x 4.   Extremities:  No peripheral edema.     DiagnosticResults       RADIOLOGY:  CT ABDOMEN PELVIS W IV CONTRAST Additional Contrast? None   Final Result      There is no evidence of bowel obstruction. Patient has had a total colectomy no evidence of a Hines's pouch. A rectal wall thickening is noted.      Multiple bladder stones are identified.      There is fluid identified in the vaginal vault of uncertain etiology.       Electronically signed by Deysi Haddad MD          LABS:   Results for orders placed or performed during the hospital encounter of 04/04/24   CBC with

## 2024-04-05 PROBLEM — N39.0 UTI (URINARY TRACT INFECTION): Status: ACTIVE | Noted: 2024-04-05

## 2024-04-05 LAB
ALBUMIN SERPL-MCNC: 3.4 G/DL (ref 3.4–5)
ALBUMIN/GLOB SERPL: 1.7 {RATIO} (ref 1.1–2.2)
ALP SERPL-CCNC: 99 U/L (ref 40–129)
ALT SERPL-CCNC: 24 U/L (ref 10–40)
ANION GAP SERPL CALCULATED.3IONS-SCNC: 10 MMOL/L (ref 3–16)
AST SERPL-CCNC: 26 U/L (ref 15–37)
BILIRUB SERPL-MCNC: 0.3 MG/DL (ref 0–1)
BUN SERPL-MCNC: 11 MG/DL (ref 7–20)
C DIFF TOX A+B STL QL IA: NORMAL
CALCIUM SERPL-MCNC: 8.1 MG/DL (ref 8.3–10.6)
CHLORIDE SERPL-SCNC: 100 MMOL/L (ref 99–110)
CO2 SERPL-SCNC: 27 MMOL/L (ref 21–32)
CREAT SERPL-MCNC: <0.5 MG/DL (ref 0.6–1.2)
GFR SERPLBLD CREATININE-BSD FMLA CKD-EPI: >90 ML/MIN/{1.73_M2}
GLUCOSE BLD-MCNC: 105 MG/DL (ref 70–99)
GLUCOSE BLD-MCNC: 113 MG/DL (ref 70–99)
GLUCOSE BLD-MCNC: 133 MG/DL (ref 70–99)
GLUCOSE BLD-MCNC: 84 MG/DL (ref 70–99)
GLUCOSE BLD-MCNC: 96 MG/DL (ref 70–99)
GLUCOSE SERPL-MCNC: 113 MG/DL (ref 70–99)
MAGNESIUM SERPL-MCNC: 1.7 MG/DL (ref 1.8–2.4)
PERFORMED ON: ABNORMAL
PERFORMED ON: NORMAL
PERFORMED ON: NORMAL
POTASSIUM SERPL-SCNC: 3.3 MMOL/L (ref 3.5–5.1)
PROT SERPL-MCNC: 5.4 G/DL (ref 6.4–8.2)
SODIUM SERPL-SCNC: 137 MMOL/L (ref 136–145)

## 2024-04-05 PROCEDURE — 2580000003 HC RX 258: Performed by: FAMILY MEDICINE

## 2024-04-05 PROCEDURE — 87324 CLOSTRIDIUM AG IA: CPT

## 2024-04-05 PROCEDURE — 6360000002 HC RX W HCPCS: Performed by: FAMILY MEDICINE

## 2024-04-05 PROCEDURE — 2580000003 HC RX 258: Performed by: STUDENT IN AN ORGANIZED HEALTH CARE EDUCATION/TRAINING PROGRAM

## 2024-04-05 PROCEDURE — 36415 COLL VENOUS BLD VENIPUNCTURE: CPT

## 2024-04-05 PROCEDURE — 6360000002 HC RX W HCPCS: Performed by: STUDENT IN AN ORGANIZED HEALTH CARE EDUCATION/TRAINING PROGRAM

## 2024-04-05 PROCEDURE — 1200000000 HC SEMI PRIVATE

## 2024-04-05 PROCEDURE — 6370000000 HC RX 637 (ALT 250 FOR IP): Performed by: STUDENT IN AN ORGANIZED HEALTH CARE EDUCATION/TRAINING PROGRAM

## 2024-04-05 PROCEDURE — 87449 NOS EACH ORGANISM AG IA: CPT

## 2024-04-05 PROCEDURE — 80053 COMPREHEN METABOLIC PANEL: CPT

## 2024-04-05 PROCEDURE — 87040 BLOOD CULTURE FOR BACTERIA: CPT

## 2024-04-05 PROCEDURE — 6370000000 HC RX 637 (ALT 250 FOR IP): Performed by: FAMILY MEDICINE

## 2024-04-05 PROCEDURE — 6370000000 HC RX 637 (ALT 250 FOR IP): Performed by: NURSE PRACTITIONER

## 2024-04-05 PROCEDURE — 83735 ASSAY OF MAGNESIUM: CPT

## 2024-04-05 RX ORDER — POLYVINYL ALCOHOL 14 MG/ML
1 SOLUTION/ DROPS OPHTHALMIC 4 TIMES DAILY
Status: DISCONTINUED | OUTPATIENT
Start: 2024-04-06 | End: 2024-04-13 | Stop reason: HOSPADM

## 2024-04-05 RX ORDER — OXYCODONE HYDROCHLORIDE 5 MG/1
5 TABLET ORAL ONCE
Status: COMPLETED | OUTPATIENT
Start: 2024-04-05 | End: 2024-04-05

## 2024-04-05 RX ORDER — POTASSIUM CHLORIDE 20 MEQ/1
40 TABLET, EXTENDED RELEASE ORAL ONCE
Status: COMPLETED | OUTPATIENT
Start: 2024-04-05 | End: 2024-04-05

## 2024-04-05 RX ORDER — SODIUM CHLORIDE 9 MG/ML
INJECTION, SOLUTION INTRAVENOUS PRN
Status: DISCONTINUED | OUTPATIENT
Start: 2024-04-05 | End: 2024-04-13 | Stop reason: HOSPADM

## 2024-04-05 RX ORDER — DEXTROSE, SODIUM CHLORIDE, SODIUM LACTATE, POTASSIUM CHLORIDE, AND CALCIUM CHLORIDE 5; .6; .31; .03; .02 G/100ML; G/100ML; G/100ML; G/100ML; G/100ML
1000 INJECTION, SOLUTION INTRAVENOUS CONTINUOUS
Status: DISCONTINUED | OUTPATIENT
Start: 2024-04-05 | End: 2024-04-10

## 2024-04-05 RX ORDER — ENOXAPARIN SODIUM 100 MG/ML
40 INJECTION SUBCUTANEOUS DAILY
Status: DISCONTINUED | OUTPATIENT
Start: 2024-04-05 | End: 2024-04-13 | Stop reason: HOSPADM

## 2024-04-05 RX ORDER — ONDANSETRON 2 MG/ML
4 INJECTION INTRAMUSCULAR; INTRAVENOUS EVERY 6 HOURS PRN
Status: DISCONTINUED | OUTPATIENT
Start: 2024-04-05 | End: 2024-04-13 | Stop reason: HOSPADM

## 2024-04-05 RX ORDER — POTASSIUM CHLORIDE 20 MEQ/1
40 TABLET, EXTENDED RELEASE ORAL PRN
Status: DISCONTINUED | OUTPATIENT
Start: 2024-04-05 | End: 2024-04-07

## 2024-04-05 RX ORDER — ACETAMINOPHEN 650 MG/1
650 SUPPOSITORY RECTAL EVERY 6 HOURS PRN
Status: DISCONTINUED | OUTPATIENT
Start: 2024-04-05 | End: 2024-04-13 | Stop reason: HOSPADM

## 2024-04-05 RX ORDER — SIMETHICONE 40MG/0.6ML
40 SUSPENSION, DROPS(FINAL DOSAGE FORM)(ML) ORAL EVERY 6 HOURS PRN
Status: DISCONTINUED | OUTPATIENT
Start: 2024-04-05 | End: 2024-04-09

## 2024-04-05 RX ORDER — PROMETHAZINE HYDROCHLORIDE 25 MG/ML
25 INJECTION, SOLUTION INTRAMUSCULAR; INTRAVENOUS EVERY 4 HOURS PRN
Status: DISCONTINUED | OUTPATIENT
Start: 2024-04-05 | End: 2024-04-13 | Stop reason: HOSPADM

## 2024-04-05 RX ORDER — FLUOXETINE HYDROCHLORIDE 20 MG/1
40 CAPSULE ORAL DAILY
Status: ON HOLD | COMMUNITY
End: 2024-04-13 | Stop reason: HOSPADM

## 2024-04-05 RX ORDER — SODIUM CHLORIDE 0.9 % (FLUSH) 0.9 %
5-40 SYRINGE (ML) INJECTION EVERY 12 HOURS SCHEDULED
Status: DISCONTINUED | OUTPATIENT
Start: 2024-04-05 | End: 2024-04-13 | Stop reason: HOSPADM

## 2024-04-05 RX ORDER — ONDANSETRON 2 MG/ML
4 INJECTION INTRAMUSCULAR; INTRAVENOUS EVERY 6 HOURS PRN
Status: DISCONTINUED | OUTPATIENT
Start: 2024-04-05 | End: 2024-04-05

## 2024-04-05 RX ORDER — DICYCLOMINE HYDROCHLORIDE 10 MG/1
10 CAPSULE ORAL 3 TIMES DAILY
Status: DISCONTINUED | OUTPATIENT
Start: 2024-04-05 | End: 2024-04-06

## 2024-04-05 RX ORDER — ACETAMINOPHEN 325 MG/1
650 TABLET ORAL EVERY 6 HOURS PRN
Status: DISCONTINUED | OUTPATIENT
Start: 2024-04-05 | End: 2024-04-13 | Stop reason: HOSPADM

## 2024-04-05 RX ORDER — PROMETHAZINE HYDROCHLORIDE 25 MG/1
25 TABLET ORAL EVERY 6 HOURS PRN
Status: DISCONTINUED | OUTPATIENT
Start: 2024-04-05 | End: 2024-04-05

## 2024-04-05 RX ORDER — LORAZEPAM 0.5 MG/1
0.5 TABLET ORAL 2 TIMES DAILY
Status: DISCONTINUED | OUTPATIENT
Start: 2024-04-05 | End: 2024-04-13 | Stop reason: HOSPADM

## 2024-04-05 RX ORDER — CARBAMAZEPINE 200 MG/1
200 TABLET ORAL 4 TIMES DAILY
Status: DISCONTINUED | OUTPATIENT
Start: 2024-04-05 | End: 2024-04-13 | Stop reason: HOSPADM

## 2024-04-05 RX ORDER — MAGNESIUM SULFATE IN WATER 40 MG/ML
2000 INJECTION, SOLUTION INTRAVENOUS PRN
Status: DISCONTINUED | OUTPATIENT
Start: 2024-04-05 | End: 2024-04-07

## 2024-04-05 RX ORDER — POTASSIUM CHLORIDE 7.45 MG/ML
10 INJECTION INTRAVENOUS PRN
Status: DISCONTINUED | OUTPATIENT
Start: 2024-04-05 | End: 2024-04-07

## 2024-04-05 RX ORDER — SODIUM CHLORIDE 0.9 % (FLUSH) 0.9 %
5-40 SYRINGE (ML) INJECTION PRN
Status: DISCONTINUED | OUTPATIENT
Start: 2024-04-05 | End: 2024-04-13 | Stop reason: HOSPADM

## 2024-04-05 RX ORDER — PROMETHAZINE HYDROCHLORIDE 25 MG/1
12.5 TABLET ORAL EVERY 6 HOURS PRN
Status: DISCONTINUED | OUTPATIENT
Start: 2024-04-05 | End: 2024-04-05

## 2024-04-05 RX ORDER — POLYETHYLENE GLYCOL 3350 17 G/17G
17 POWDER, FOR SOLUTION ORAL DAILY PRN
Status: DISCONTINUED | OUTPATIENT
Start: 2024-04-05 | End: 2024-04-13 | Stop reason: HOSPADM

## 2024-04-05 RX ORDER — OXYCODONE HYDROCHLORIDE 5 MG/1
5 TABLET ORAL EVERY 6 HOURS PRN
Status: DISCONTINUED | OUTPATIENT
Start: 2024-04-05 | End: 2024-04-13 | Stop reason: HOSPADM

## 2024-04-05 RX ADMIN — ENOXAPARIN SODIUM 40 MG: 100 INJECTION SUBCUTANEOUS at 07:56

## 2024-04-05 RX ADMIN — Medication 40 MG: at 22:44

## 2024-04-05 RX ADMIN — ONDANSETRON 4 MG: 2 INJECTION INTRAMUSCULAR; INTRAVENOUS at 13:58

## 2024-04-05 RX ADMIN — DICYCLOMINE HYDROCHLORIDE 10 MG: 10 CAPSULE ORAL at 21:05

## 2024-04-05 RX ADMIN — WATER 1000 MG: 1 INJECTION INTRAMUSCULAR; INTRAVENOUS; SUBCUTANEOUS at 23:26

## 2024-04-05 RX ADMIN — ACETAMINOPHEN 650 MG: 325 TABLET ORAL at 01:09

## 2024-04-05 RX ADMIN — LORAZEPAM 0.5 MG: 0.5 TABLET ORAL at 13:58

## 2024-04-05 RX ADMIN — SODIUM CHLORIDE, PRESERVATIVE FREE 10 ML: 5 INJECTION INTRAVENOUS at 07:56

## 2024-04-05 RX ADMIN — POTASSIUM CHLORIDE 40 MEQ: 1500 TABLET, EXTENDED RELEASE ORAL at 10:56

## 2024-04-05 RX ADMIN — ONDANSETRON 4 MG: 2 INJECTION INTRAMUSCULAR; INTRAVENOUS at 07:56

## 2024-04-05 RX ADMIN — LORAZEPAM 0.5 MG: 0.5 TABLET ORAL at 21:05

## 2024-04-05 RX ADMIN — CARBAMAZEPINE 200 MG: 200 TABLET ORAL at 21:04

## 2024-04-05 RX ADMIN — SODIUM CHLORIDE, SODIUM LACTATE, POTASSIUM CHLORIDE, CALCIUM CHLORIDE AND DEXTROSE MONOHYDRATE 1000 ML: 5; 600; 310; 30; 20 INJECTION, SOLUTION INTRAVENOUS at 23:13

## 2024-04-05 RX ADMIN — SODIUM CHLORIDE, SODIUM LACTATE, POTASSIUM CHLORIDE, CALCIUM CHLORIDE AND DEXTROSE MONOHYDRATE 1000 ML: 5; 600; 310; 30; 20 INJECTION, SOLUTION INTRAVENOUS at 00:06

## 2024-04-05 RX ADMIN — CARBAMAZEPINE 200 MG: 200 TABLET ORAL at 17:05

## 2024-04-05 RX ADMIN — OXYCODONE 5 MG: 5 TABLET ORAL at 03:17

## 2024-04-05 RX ADMIN — ONDANSETRON 4 MG: 2 INJECTION INTRAMUSCULAR; INTRAVENOUS at 03:00

## 2024-04-05 RX ADMIN — WATER 1000 MG: 1 INJECTION INTRAMUSCULAR; INTRAVENOUS; SUBCUTANEOUS at 00:03

## 2024-04-05 RX ADMIN — OXYCODONE 5 MG: 5 TABLET ORAL at 19:13

## 2024-04-05 ASSESSMENT — PAIN SCALES - GENERAL
PAINLEVEL_OUTOF10: 8
PAINLEVEL_OUTOF10: 7
PAINLEVEL_OUTOF10: 5
PAINLEVEL_OUTOF10: 7
PAINLEVEL_OUTOF10: 6

## 2024-04-05 ASSESSMENT — LIFESTYLE VARIABLES
HOW MANY STANDARD DRINKS CONTAINING ALCOHOL DO YOU HAVE ON A TYPICAL DAY: PATIENT DOES NOT DRINK
HOW OFTEN DO YOU HAVE A DRINK CONTAINING ALCOHOL: NEVER

## 2024-04-05 ASSESSMENT — PAIN DESCRIPTION - PAIN TYPE
TYPE: ACUTE PAIN
TYPE: ACUTE PAIN

## 2024-04-05 ASSESSMENT — PAIN - FUNCTIONAL ASSESSMENT
PAIN_FUNCTIONAL_ASSESSMENT: ACTIVITIES ARE NOT PREVENTED
PAIN_FUNCTIONAL_ASSESSMENT: ACTIVITIES ARE NOT PREVENTED

## 2024-04-05 ASSESSMENT — PAIN DESCRIPTION - LOCATION
LOCATION: ABDOMEN
LOCATION: ABDOMEN
LOCATION: ABDOMEN;BACK;NECK

## 2024-04-05 ASSESSMENT — PAIN DESCRIPTION - ORIENTATION
ORIENTATION: MID
ORIENTATION: MID

## 2024-04-05 ASSESSMENT — PAIN DESCRIPTION - ONSET
ONSET: ON-GOING
ONSET: ON-GOING

## 2024-04-05 ASSESSMENT — PAIN DESCRIPTION - DESCRIPTORS
DESCRIPTORS: ACHING

## 2024-04-05 ASSESSMENT — PAIN DESCRIPTION - FREQUENCY
FREQUENCY: CONTINUOUS
FREQUENCY: CONTINUOUS

## 2024-04-05 NOTE — ED NOTES
ED TO INPATIENT SBAR HANDOFF    Patient Name: Liana Salmeron   :  1955  68 y.o.   MRN:  [id number]  Preferred Name    ED Room #:  A05/A05-05  Family/Caregiver Present no   Restraints no   Sitter no   Sepsis Risk Score Sepsis Risk Score: 0.44    Situation  Code Status: Full Code No additional code details.    Allergies: Albuterol, Dilaudid  [hydromorphone hcl], Hydrocodone-acetaminophen, Meperidine, Meperidine hcl, Morphine, Milk (cow), Shellfish-derived products, Versed [midazolam], and Nsaids  Weight: Patient Vitals for the past 96 hrs (Last 3 readings):   Weight   24 1938 64 kg (141 lb 3.2 oz)     Arrived from: nursing home  Chief Complaint:   Chief Complaint   Patient presents with    Abdominal Pain     Hospital Problem/Diagnosis:  Principal Problem:    UTI (urinary tract infection)  Resolved Problems:    * No resolved hospital problems. *    Imaging:   CT ABDOMEN PELVIS W IV CONTRAST Additional Contrast? None   Final Result      There is no evidence of bowel obstruction. Patient has had a total colectomy no evidence of a Hines's pouch. A rectal wall thickening is noted.      Multiple bladder stones are identified.      There is fluid identified in the vaginal vault of uncertain etiology.       Electronically signed by Deysi Haddad MD        Abnormal labs:   Abnormal Labs Reviewed   BASIC METABOLIC PANEL W/ REFLEX TO MG FOR LOW K - Abnormal; Notable for the following components:       Result Value    Potassium reflex Magnesium 3.4 (*)     Chloride 96 (*)     Anion Gap 19 (*)     Glucose 45 (*)     Creatinine <0.5 (*)     All other components within normal limits    Narrative:     CALL  Pate  [employer] tel. [phone],  Chemistry results called to and read back by CHRIS NOVA RN, 2024 20:45,  by [employer]   HEPATIC FUNCTION PANEL - Abnormal; Notable for the following components:    Total Protein 6.2 (*)     All other components within normal limits    Narrative:     CALL  Pate  [facility] tel.

## 2024-04-05 NOTE — ED PROVIDER NOTES
ED Attending Attestation Note     Date of evaluation: 4/4/2024    This patient was seen by the resident.  I have seen and examined the patient, agree with the workup, evaluation, management and diagnosis. The care plan has been discussed.      My assessment reveals a 68-year-old female presenting to the emergency department with a complaint of abdominal bloating and pain.  On examination the patient has multiple prior abdominal surgical scars that are all well-healing there is an ostomy in the right mid abdomen which has stool and air within the ostomy bag.  Her abdomen is soft with no rebound or guarding.  No significant distention     Rene Jaramillo MD  04/04/24 2040

## 2024-04-05 NOTE — PLAN OF CARE
Problem: Pain  Goal: Verbalizes/displays adequate comfort level or baseline comfort level  4/5/2024 0931 by Zabrina Marie RN  Flowsheets (Taken 4/5/2024 0931)  Verbalizes/displays adequate comfort level or baseline comfort level:   Encourage patient to monitor pain and request assistance   Assess pain using appropriate pain scale  4/5/2024 0352 by Dora Duke RN  Outcome: Progressing

## 2024-04-05 NOTE — PROGRESS NOTES
Patient to room 5307 from ED. Patient is A&O x 4. VSS. Patient oriented to the room all safety measures in place. Patient given IS and SCDs at this time. Admission orders released and patient 4 eyes completed. Admission documentation completed. No other needs are noted at this time.    [x] Bed alarm on and cord plugged into wall  [x] Bed in lowest position  [x] Call light and bedside table within reach  [x] Patient educated on all safety measures  []Oxygen connected to wall (if applicable)     Nurse 1 Esignature: Electronically signed by Dora Duke, RN on 4/5/24 at 3:08 AM EDT  Nurse 2 Esignature: Electronically signed by Jennie Burgess RN on 4/5/24 at 3:52 AM EDT

## 2024-04-05 NOTE — CARE COORDINATION
Case Management Assessment  Initial Evaluation    Date/Time of Evaluation: 4/5/2024 3:05 PM  Assessment Completed by: Corazon Gorman RN    If patient is discharged prior to next notation, then this note serves as note for discharge by case management.    Patient Name: Liana Salmeron                   YOB: 1955  Diagnosis: Hypokalemia [E87.6]  Hypomagnesemia [E83.42]  UTI (urinary tract infection) [N39.0]  Abdominal pain, unspecified abdominal location [R10.9]                   Date / Time: 4/4/2024  7:33 PM    Patient Admission Status: Inpatient   Readmission Risk (Low < 19, Mod (19-27), High > 27): Readmission Risk Score: 11.8    Current PCP: Tristan Santiago MD  PCP verified by CM? No    Chart Reviewed: Yes      History Provided by: Patient  Patient Orientation: Alert and Oriented    Patient Cognition: Alert    Hospitalization in the last 30 days (Readmission):  No    If yes, Readmission Assessment in  Navigator will be completed.    Advance Directives:      Code Status: Full Code   Patient's Primary Decision Maker is: Legal Next of Kin      Discharge Planning:    Patient lives with: Alone Type of Home: Long-Term Care  Primary Care Giver:    Patient Support Systems include: /   Current Financial resources: Medicare  Current community resources: None  Current services prior to admission: Extended Care Placement            Current DME:              Type of Home Care services:  None    ADLS  Prior functional level: Assistance with the following:, Bathing, Cooking, Housework, Shopping, Mobility  Current functional level: Assistance with the following:, Bathing, Cooking, Housework, Shopping, Mobility    PT AM-PAC:   /24  OT AM-PAC:   /24    Family can provide assistance at DC: No  Would you like Case Management to discuss the discharge plan with any other family members/significant others, and if so, who? No  Plans to Return to Present Housing: Yes  Other  Identified Issues/Barriers to RETURNING to current housing: n/a  Potential Assistance needed at discharge: Transportation            Potential DME:    Patient expects to discharge to: Long-term care  Plan for transportation at discharge: Self    Financial    Payor: MEDICARE / Plan: MEDICARE PART A AND B / Product Type: *No Product type* /     Does insurance require precert for SNF: No    Potential assistance Purchasing Medications: No  Meds-to-Beds request:        [facility]/pharmacy #[id number] - [place], [place] - [address] - P [phone] - F [phone]  [address] [place] [address]  Phone: [phone] Fax: [phone]      Notes:    Factors facilitating achievement of predicted outcomes: Caregiver support, Motivated, and Cooperative    Barriers to discharge: Pain and IV ABX    Additional Case Management Notes: Patient is from [facility]. Confirmed with Kiana in admissions that she is a paid bed hold and can return when medically ready. Will need transport at d/c.     The Plan for Transition of Care is related to the following treatment goals of Hypokalemia [E87.6]  Hypomagnesemia [E83.42]  UTI (urinary tract infection) [N39.0]  Abdominal pain, unspecified abdominal location [R10.9]    IF APPLICABLE: The Patient and/or patient representative Liana and her family were provided with a choice of provider and agrees with the discharge plan. Freedom of choice list with basic dialogue that supports the patient's individualized plan of care/goals and shares the quality data associated with the providers was provided to: Patient   Patient Representative Name:       The Patient and/or Patient Representative Agree with the Discharge Plan? Yes    Corazon Gorman RN  Case Management Department  Ph: [phone] Fax: [phone]

## 2024-04-05 NOTE — PROGRESS NOTES
4 Eyes Skin Assessment     NAME:  Liana Salmeron  YOB: 1955  MEDICAL RECORD NUMBER:  [id number]    The patient is being assessed for  Admission    I agree that at least one RN has performed a thorough Head to Toe Skin Assessment on the patient. ALL assessment sites listed below have been assessed.      Areas assessed by both nurses:    Head, Face, Ears, Shoulders, Back, Chest, Arms, Elbows, Hands, Sacrum. Buttock, Coccyx, Ischium, Legs. Feet and Heels, and Under Medical Devices         Does the Patient have a Wound? Yes wound(s) were present on assessment. LDA wound assessment was Initiated and completed by RN    Pt with blanchable redness to coccyx       Sidney Prevention initiated by RN: Yes  Wound Care Orders initiated by RN: Yes    Pressure Injury (Stage 3,4, Unstageable, DTI, NWPT, and Complex wounds) if present, place Wound referral order by RN under : No    New Ostomies, if present place, Ostomy referral order under : No     Nurse 1 eSignature: Electronically signed by Dora Duke RN on 4/5/24 at 3:07 AM EDT    **SHARE this note so that the co-signing nurse can place an eSignature**    Nurse 2 eSignature: Electronically signed by Jennie Burgess RN on 4/5/24 at 3:52 AM EDT

## 2024-04-05 NOTE — PROGRESS NOTES
Pt alert and oriented. VSS. Pt reporting nausea and abdominal pain. Pt given PRN Zofran and one time dose of Roxicodone ordered, see MAR. Pt has IVF infusing. Pt ostomy in place C-Diff sent per protocol. Pt has purwick in place. Pt has call light within reach, bed in lowest position with wheels locked, 2/4 side rails up, and bed alarm on.
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
## 2024-04-05 NOTE — PROGRESS NOTES
Alert and oriented x4, VSS. C/o nausea prns given with pos. Effects. Denies any pain. Purewick is in place, collecting yellow urine. Ostomy CDI with brown output. Regular diet but decreased appetite. Turn q 2 hours. Call light is within reach, no needs at this time.     Electronically signed by Zabrina Marie RN on 4/5/2024 at 2:30 PM

## 2024-04-06 LAB
ALBUMIN SERPL-MCNC: 3.2 G/DL (ref 3.4–5)
ALBUMIN/GLOB SERPL: 1.6 {RATIO} (ref 1.1–2.2)
ALP SERPL-CCNC: 97 U/L (ref 40–129)
ALT SERPL-CCNC: 20 U/L (ref 10–40)
ANION GAP SERPL CALCULATED.3IONS-SCNC: 9 MMOL/L (ref 3–16)
AST SERPL-CCNC: 23 U/L (ref 15–37)
BASOPHILS # BLD: 0 K/UL (ref 0–0.2)
BASOPHILS NFR BLD: 0.7 %
BILIRUB SERPL-MCNC: <0.2 MG/DL (ref 0–1)
BUN SERPL-MCNC: 4 MG/DL (ref 7–20)
CALCIUM SERPL-MCNC: 8.3 MG/DL (ref 8.3–10.6)
CHLORIDE SERPL-SCNC: 101 MMOL/L (ref 99–110)
CO2 SERPL-SCNC: 28 MMOL/L (ref 21–32)
CREAT SERPL-MCNC: <0.5 MG/DL (ref 0.6–1.2)
DEPRECATED RDW RBC AUTO: 12.7 % (ref 12.4–15.4)
EKG ATRIAL RATE: 70 BPM
EKG DIAGNOSIS: NORMAL
EKG P AXIS: 72 DEGREES
EKG P-R INTERVAL: 170 MS
EKG Q-T INTERVAL: 408 MS
EKG QRS DURATION: 66 MS
EKG QTC CALCULATION (BAZETT): 440 MS
EKG R AXIS: 71 DEGREES
EKG T AXIS: 41 DEGREES
EKG VENTRICULAR RATE: 70 BPM
EOSINOPHIL # BLD: 0.2 K/UL (ref 0–0.6)
EOSINOPHIL NFR BLD: 2.9 %
GFR SERPLBLD CREATININE-BSD FMLA CKD-EPI: >90 ML/MIN/{1.73_M2}
GLUCOSE BLD-MCNC: 100 MG/DL (ref 70–99)
GLUCOSE BLD-MCNC: 103 MG/DL (ref 70–99)
GLUCOSE BLD-MCNC: 103 MG/DL (ref 70–99)
GLUCOSE BLD-MCNC: 122 MG/DL (ref 70–99)
GLUCOSE BLD-MCNC: 92 MG/DL (ref 70–99)
GLUCOSE BLD-MCNC: 92 MG/DL (ref 70–99)
GLUCOSE BLD-MCNC: 93 MG/DL (ref 70–99)
GLUCOSE SERPL-MCNC: 123 MG/DL (ref 70–99)
HCT VFR BLD AUTO: 37.8 % (ref 36–48)
HGB BLD-MCNC: 13 G/DL (ref 12–16)
LYMPHOCYTES # BLD: 0.8 K/UL (ref 1–5.1)
LYMPHOCYTES NFR BLD: 14.5 %
MAGNESIUM SERPL-MCNC: 1.4 MG/DL (ref 1.8–2.4)
MCH RBC QN AUTO: 33.9 PG (ref 26–34)
MCHC RBC AUTO-ENTMCNC: 34.5 G/DL (ref 31–36)
MCV RBC AUTO: 98.4 FL (ref 80–100)
MONOCYTES # BLD: 1.1 K/UL (ref 0–1.3)
MONOCYTES NFR BLD: 19.2 %
NEUTROPHILS # BLD: 3.6 K/UL (ref 1.7–7.7)
NEUTROPHILS NFR BLD: 62.7 %
PERFORMED ON: ABNORMAL
PERFORMED ON: NORMAL
PLATELET # BLD AUTO: 147 K/UL (ref 135–450)
PMV BLD AUTO: 9.9 FL (ref 5–10.5)
POTASSIUM SERPL-SCNC: 3.4 MMOL/L (ref 3.5–5.1)
PREALB SERPL-MCNC: 12.5 MG/DL (ref 20–40)
PROT SERPL-MCNC: 5.2 G/DL (ref 6.4–8.2)
RBC # BLD AUTO: 3.84 M/UL (ref 4–5.2)
SODIUM SERPL-SCNC: 138 MMOL/L (ref 136–145)
WBC # BLD AUTO: 5.7 K/UL (ref 4–11)

## 2024-04-06 PROCEDURE — 6360000002 HC RX W HCPCS

## 2024-04-06 PROCEDURE — 93010 ELECTROCARDIOGRAM REPORT: CPT | Performed by: INTERNAL MEDICINE

## 2024-04-06 PROCEDURE — 6370000000 HC RX 637 (ALT 250 FOR IP)

## 2024-04-06 PROCEDURE — 85025 COMPLETE CBC W/AUTO DIFF WBC: CPT

## 2024-04-06 PROCEDURE — 6370000000 HC RX 637 (ALT 250 FOR IP): Performed by: STUDENT IN AN ORGANIZED HEALTH CARE EDUCATION/TRAINING PROGRAM

## 2024-04-06 PROCEDURE — C9113 INJ PANTOPRAZOLE SODIUM, VIA: HCPCS

## 2024-04-06 PROCEDURE — 6360000002 HC RX W HCPCS: Performed by: STUDENT IN AN ORGANIZED HEALTH CARE EDUCATION/TRAINING PROGRAM

## 2024-04-06 PROCEDURE — 80053 COMPREHEN METABOLIC PANEL: CPT

## 2024-04-06 PROCEDURE — 6360000002 HC RX W HCPCS: Performed by: NURSE PRACTITIONER

## 2024-04-06 PROCEDURE — 6360000002 HC RX W HCPCS: Performed by: FAMILY MEDICINE

## 2024-04-06 PROCEDURE — 2580000003 HC RX 258: Performed by: INTERNAL MEDICINE

## 2024-04-06 PROCEDURE — 6360000002 HC RX W HCPCS: Performed by: INTERNAL MEDICINE

## 2024-04-06 PROCEDURE — 2580000003 HC RX 258

## 2024-04-06 PROCEDURE — 83735 ASSAY OF MAGNESIUM: CPT

## 2024-04-06 PROCEDURE — C9113 INJ PANTOPRAZOLE SODIUM, VIA: HCPCS | Performed by: INTERNAL MEDICINE

## 2024-04-06 PROCEDURE — 6370000000 HC RX 637 (ALT 250 FOR IP): Performed by: NURSE PRACTITIONER

## 2024-04-06 PROCEDURE — 1200000000 HC SEMI PRIVATE

## 2024-04-06 PROCEDURE — 99222 1ST HOSP IP/OBS MODERATE 55: CPT | Performed by: SURGERY

## 2024-04-06 PROCEDURE — 2580000003 HC RX 258: Performed by: STUDENT IN AN ORGANIZED HEALTH CARE EDUCATION/TRAINING PROGRAM

## 2024-04-06 PROCEDURE — 93005 ELECTROCARDIOGRAM TRACING: CPT

## 2024-04-06 PROCEDURE — A4216 STERILE WATER/SALINE, 10 ML: HCPCS | Performed by: INTERNAL MEDICINE

## 2024-04-06 PROCEDURE — 84134 ASSAY OF PREALBUMIN: CPT

## 2024-04-06 PROCEDURE — 6370000000 HC RX 637 (ALT 250 FOR IP): Performed by: INTERNAL MEDICINE

## 2024-04-06 RX ORDER — METOCLOPRAMIDE HYDROCHLORIDE 5 MG/ML
10 INJECTION INTRAMUSCULAR; INTRAVENOUS ONCE
Status: COMPLETED | OUTPATIENT
Start: 2024-04-06 | End: 2024-04-06

## 2024-04-06 RX ORDER — LIDOCAINE 4 G/G
1 PATCH TOPICAL DAILY
Status: DISCONTINUED | OUTPATIENT
Start: 2024-04-06 | End: 2024-04-13 | Stop reason: HOSPADM

## 2024-04-06 RX ORDER — DICYCLOMINE HYDROCHLORIDE 10 MG/1
20 CAPSULE ORAL 3 TIMES DAILY
Status: DISCONTINUED | OUTPATIENT
Start: 2024-04-06 | End: 2024-04-13 | Stop reason: HOSPADM

## 2024-04-06 RX ORDER — METHOCARBAMOL 500 MG/1
500 TABLET, FILM COATED ORAL ONCE
Status: COMPLETED | OUTPATIENT
Start: 2024-04-06 | End: 2024-04-06

## 2024-04-06 RX ORDER — POTASSIUM CHLORIDE 20 MEQ/1
40 TABLET, EXTENDED RELEASE ORAL ONCE
Status: COMPLETED | OUTPATIENT
Start: 2024-04-06 | End: 2024-04-06

## 2024-04-06 RX ORDER — METOCLOPRAMIDE 10 MG/1
5 TABLET ORAL
Status: DISCONTINUED | OUTPATIENT
Start: 2024-04-06 | End: 2024-04-06

## 2024-04-06 RX ORDER — DEXAMETHASONE SODIUM PHOSPHATE 4 MG/ML
4 INJECTION, SOLUTION INTRA-ARTICULAR; INTRALESIONAL; INTRAMUSCULAR; INTRAVENOUS; SOFT TISSUE ONCE
Status: COMPLETED | OUTPATIENT
Start: 2024-04-06 | End: 2024-04-06

## 2024-04-06 RX ORDER — MAGNESIUM SULFATE 1 G/100ML
1000 INJECTION INTRAVENOUS ONCE
Status: COMPLETED | OUTPATIENT
Start: 2024-04-06 | End: 2024-04-06

## 2024-04-06 RX ADMIN — SODIUM CHLORIDE, PRESERVATIVE FREE 40 MG: 5 INJECTION INTRAVENOUS at 20:09

## 2024-04-06 RX ADMIN — DICYCLOMINE HYDROCHLORIDE 20 MG: 10 CAPSULE ORAL at 20:09

## 2024-04-06 RX ADMIN — POLYVINYL ALCOHOL 1 DROP: 14 SOLUTION/ DROPS OPHTHALMIC at 23:13

## 2024-04-06 RX ADMIN — SODIUM CHLORIDE, PRESERVATIVE FREE 40 MG: 5 INJECTION INTRAVENOUS at 11:22

## 2024-04-06 RX ADMIN — ONDANSETRON 4 MG: 2 INJECTION INTRAMUSCULAR; INTRAVENOUS at 12:31

## 2024-04-06 RX ADMIN — POTASSIUM CHLORIDE 40 MEQ: 1500 TABLET, EXTENDED RELEASE ORAL at 11:21

## 2024-04-06 RX ADMIN — CARBAMAZEPINE 200 MG: 200 TABLET ORAL at 09:10

## 2024-04-06 RX ADMIN — CARBAMAZEPINE 200 MG: 200 TABLET ORAL at 17:07

## 2024-04-06 RX ADMIN — WATER 1000 MG: 1 INJECTION INTRAMUSCULAR; INTRAVENOUS; SUBCUTANEOUS at 23:14

## 2024-04-06 RX ADMIN — CARBAMAZEPINE 200 MG: 200 TABLET ORAL at 13:02

## 2024-04-06 RX ADMIN — OXYCODONE 5 MG: 5 TABLET ORAL at 19:26

## 2024-04-06 RX ADMIN — DEXAMETHASONE SODIUM PHOSPHATE 4 MG: 4 INJECTION INTRA-ARTICULAR; INTRALESIONAL; INTRAMUSCULAR; INTRAVENOUS; SOFT TISSUE at 17:07

## 2024-04-06 RX ADMIN — ENOXAPARIN SODIUM 40 MG: 100 INJECTION SUBCUTANEOUS at 09:12

## 2024-04-06 RX ADMIN — DICYCLOMINE HYDROCHLORIDE 20 MG: 10 CAPSULE ORAL at 15:33

## 2024-04-06 RX ADMIN — MAGNESIUM SULFATE HEPTAHYDRATE 1000 MG: 1 INJECTION, SOLUTION INTRAVENOUS at 11:26

## 2024-04-06 RX ADMIN — METOCLOPRAMIDE HYDROCHLORIDE 10 MG: 5 INJECTION INTRAMUSCULAR; INTRAVENOUS at 12:27

## 2024-04-06 RX ADMIN — ONDANSETRON 4 MG: 2 INJECTION INTRAMUSCULAR; INTRAVENOUS at 02:10

## 2024-04-06 RX ADMIN — LORAZEPAM 0.5 MG: 0.5 TABLET ORAL at 20:09

## 2024-04-06 RX ADMIN — DICYCLOMINE HYDROCHLORIDE 10 MG: 10 CAPSULE ORAL at 09:10

## 2024-04-06 RX ADMIN — POLYVINYL ALCOHOL 1 DROP: 14 SOLUTION/ DROPS OPHTHALMIC at 09:18

## 2024-04-06 RX ADMIN — POLYVINYL ALCOHOL 1 DROP: 14 SOLUTION/ DROPS OPHTHALMIC at 12:28

## 2024-04-06 RX ADMIN — CARBAMAZEPINE 200 MG: 200 TABLET ORAL at 20:15

## 2024-04-06 RX ADMIN — OXYCODONE 5 MG: 5 TABLET ORAL at 01:23

## 2024-04-06 RX ADMIN — PROMETHAZINE HYDROCHLORIDE 25 MG: 25 INJECTION INTRAMUSCULAR; INTRAVENOUS at 04:43

## 2024-04-06 RX ADMIN — POLYVINYL ALCOHOL 1 DROP: 14 SOLUTION/ DROPS OPHTHALMIC at 17:07

## 2024-04-06 RX ADMIN — LORAZEPAM 0.5 MG: 0.5 TABLET ORAL at 09:11

## 2024-04-06 RX ADMIN — METHOCARBAMOL 500 MG: 500 TABLET ORAL at 02:02

## 2024-04-06 RX ADMIN — DONEPEZIL HYDROCHLORIDE 22.5 MG: 10 TABLET ORAL at 09:11

## 2024-04-06 ASSESSMENT — PAIN SCALES - GENERAL
PAINLEVEL_OUTOF10: 7
PAINLEVEL_OUTOF10: 7
PAINLEVEL_OUTOF10: 3
PAINLEVEL_OUTOF10: 8
PAINLEVEL_OUTOF10: 0
PAINLEVEL_OUTOF10: 5

## 2024-04-06 ASSESSMENT — PAIN - FUNCTIONAL ASSESSMENT
PAIN_FUNCTIONAL_ASSESSMENT: ACTIVITIES ARE NOT PREVENTED

## 2024-04-06 ASSESSMENT — PAIN DESCRIPTION - DESCRIPTORS
DESCRIPTORS: ACHING
DESCRIPTORS: ACHING;DISCOMFORT
DESCRIPTORS: ACHING

## 2024-04-06 ASSESSMENT — PAIN DESCRIPTION - ORIENTATION
ORIENTATION: LEFT
ORIENTATION: RIGHT;LEFT
ORIENTATION: RIGHT;LEFT

## 2024-04-06 ASSESSMENT — PAIN DESCRIPTION - LOCATION
LOCATION: ABDOMEN

## 2024-04-06 NOTE — PROGRESS NOTES
Pt A+Ox4. VSS. Pt had some pain managed with prn pain medication and a one time dose of robaxin with benefit. Pt had some nausea which was managed with PRN zofran and phenergan. Pt states the zofran didn't give relief for very long. Pt ostomy had adequate output. Pt had adequate urine output. Pt turned q2. Pt has D5LR running per MAR. Pt BS has remained within normal range but pt was encouraged to drink apple juice after each result to maintain BS overnight. Fall precautions in place. All needs met at this time. Pt aware to call if help is needed.    Electronically signed by Aaron Shah RN on 4/6/2024 at 6:12 AM
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
## 2024-04-07 PROBLEM — R10.9 ABDOMINAL PAIN: Status: ACTIVE | Noted: 2024-04-07

## 2024-04-07 LAB
ALBUMIN SERPL-MCNC: 3.3 G/DL (ref 3.4–5)
ALBUMIN/GLOB SERPL: 1.4 {RATIO} (ref 1.1–2.2)
ALP SERPL-CCNC: 113 U/L (ref 40–129)
ALT SERPL-CCNC: 20 U/L (ref 10–40)
ANION GAP SERPL CALCULATED.3IONS-SCNC: 8 MMOL/L (ref 3–16)
AST SERPL-CCNC: 22 U/L (ref 15–37)
BACTERIA UR CULT: ABNORMAL
BASOPHILS # BLD: 0 K/UL (ref 0–0.2)
BASOPHILS NFR BLD: 0.8 %
BETA-HYDROXYBUTYRATE: 4.35 MMOL/L (ref 0–0.27)
BILIRUB SERPL-MCNC: 0.3 MG/DL (ref 0–1)
BUN SERPL-MCNC: <2 MG/DL (ref 7–20)
CALCIUM SERPL-MCNC: 8.3 MG/DL (ref 8.3–10.6)
CHLORIDE SERPL-SCNC: 96 MMOL/L (ref 99–110)
CO2 SERPL-SCNC: 29 MMOL/L (ref 21–32)
CREAT SERPL-MCNC: <0.5 MG/DL (ref 0.6–1.2)
CRP SERPL-MCNC: 18.6 MG/L (ref 0–5.1)
DEPRECATED RDW RBC AUTO: 12.8 % (ref 12.4–15.4)
EOSINOPHIL # BLD: 0 K/UL (ref 0–0.6)
EOSINOPHIL NFR BLD: 1.1 %
GFR SERPLBLD CREATININE-BSD FMLA CKD-EPI: >90 ML/MIN/{1.73_M2}
GLUCOSE BLD-MCNC: 110 MG/DL (ref 70–99)
GLUCOSE BLD-MCNC: 114 MG/DL (ref 70–99)
GLUCOSE BLD-MCNC: 117 MG/DL (ref 70–99)
GLUCOSE BLD-MCNC: 23 MG/DL (ref 70–99)
GLUCOSE BLD-MCNC: 30 MG/DL (ref 70–99)
GLUCOSE BLD-MCNC: 81 MG/DL (ref 70–99)
GLUCOSE SERPL-MCNC: 88 MG/DL (ref 70–99)
HCT VFR BLD AUTO: 40.9 % (ref 36–48)
HGB BLD-MCNC: 14 G/DL (ref 12–16)
LYMPHOCYTES # BLD: 0.8 K/UL (ref 1–5.1)
LYMPHOCYTES NFR BLD: 18.4 %
MAGNESIUM SERPL-MCNC: 1.6 MG/DL (ref 1.8–2.4)
MCH RBC QN AUTO: 33.4 PG (ref 26–34)
MCHC RBC AUTO-ENTMCNC: 34.2 G/DL (ref 31–36)
MCV RBC AUTO: 97.7 FL (ref 80–100)
MONOCYTES # BLD: 0.6 K/UL (ref 0–1.3)
MONOCYTES NFR BLD: 15.3 %
NEUTROPHILS # BLD: 2.7 K/UL (ref 1.7–7.7)
NEUTROPHILS NFR BLD: 64.4 %
ORGANISM: ABNORMAL
PERFORMED ON: ABNORMAL
PERFORMED ON: NORMAL
PLATELET # BLD AUTO: 149 K/UL (ref 135–450)
PMV BLD AUTO: 10.2 FL (ref 5–10.5)
POTASSIUM SERPL-SCNC: 3.3 MMOL/L (ref 3.5–5.1)
PROT SERPL-MCNC: 5.7 G/DL (ref 6.4–8.2)
RBC # BLD AUTO: 4.19 M/UL (ref 4–5.2)
SODIUM SERPL-SCNC: 133 MMOL/L (ref 136–145)
WBC # BLD AUTO: 4.2 K/UL (ref 4–11)

## 2024-04-07 PROCEDURE — 6370000000 HC RX 637 (ALT 250 FOR IP)

## 2024-04-07 PROCEDURE — 6360000002 HC RX W HCPCS: Performed by: INTERNAL MEDICINE

## 2024-04-07 PROCEDURE — 2580000003 HC RX 258: Performed by: INTERNAL MEDICINE

## 2024-04-07 PROCEDURE — 2580000003 HC RX 258: Performed by: STUDENT IN AN ORGANIZED HEALTH CARE EDUCATION/TRAINING PROGRAM

## 2024-04-07 PROCEDURE — 83735 ASSAY OF MAGNESIUM: CPT

## 2024-04-07 PROCEDURE — 6360000002 HC RX W HCPCS: Performed by: FAMILY MEDICINE

## 2024-04-07 PROCEDURE — 86140 C-REACTIVE PROTEIN: CPT

## 2024-04-07 PROCEDURE — 80053 COMPREHEN METABOLIC PANEL: CPT

## 2024-04-07 PROCEDURE — 6360000002 HC RX W HCPCS

## 2024-04-07 PROCEDURE — 6370000000 HC RX 637 (ALT 250 FOR IP): Performed by: INTERNAL MEDICINE

## 2024-04-07 PROCEDURE — 85025 COMPLETE CBC W/AUTO DIFF WBC: CPT

## 2024-04-07 PROCEDURE — 84466 ASSAY OF TRANSFERRIN: CPT

## 2024-04-07 PROCEDURE — A4216 STERILE WATER/SALINE, 10 ML: HCPCS | Performed by: INTERNAL MEDICINE

## 2024-04-07 PROCEDURE — 1200000000 HC SEMI PRIVATE

## 2024-04-07 PROCEDURE — 99231 SBSQ HOSP IP/OBS SF/LOW 25: CPT | Performed by: SURGERY

## 2024-04-07 PROCEDURE — 36415 COLL VENOUS BLD VENIPUNCTURE: CPT

## 2024-04-07 PROCEDURE — 6370000000 HC RX 637 (ALT 250 FOR IP): Performed by: STUDENT IN AN ORGANIZED HEALTH CARE EDUCATION/TRAINING PROGRAM

## 2024-04-07 PROCEDURE — C9113 INJ PANTOPRAZOLE SODIUM, VIA: HCPCS | Performed by: INTERNAL MEDICINE

## 2024-04-07 RX ORDER — POTASSIUM CHLORIDE 20 MEQ/1
40 TABLET, EXTENDED RELEASE ORAL 2 TIMES DAILY WITH MEALS
Status: COMPLETED | OUTPATIENT
Start: 2024-04-07 | End: 2024-04-07

## 2024-04-07 RX ORDER — MAGNESIUM SULFATE IN WATER 40 MG/ML
4000 INJECTION, SOLUTION INTRAVENOUS ONCE
Status: COMPLETED | OUTPATIENT
Start: 2024-04-07 | End: 2024-04-07

## 2024-04-07 RX ADMIN — POLYVINYL ALCOHOL 1 DROP: 14 SOLUTION/ DROPS OPHTHALMIC at 12:57

## 2024-04-07 RX ADMIN — ONDANSETRON 4 MG: 2 INJECTION INTRAMUSCULAR; INTRAVENOUS at 10:04

## 2024-04-07 RX ADMIN — LORAZEPAM 0.5 MG: 0.5 TABLET ORAL at 21:15

## 2024-04-07 RX ADMIN — SODIUM CHLORIDE, PRESERVATIVE FREE 40 MG: 5 INJECTION INTRAVENOUS at 09:33

## 2024-04-07 RX ADMIN — DICYCLOMINE HYDROCHLORIDE 20 MG: 10 CAPSULE ORAL at 21:16

## 2024-04-07 RX ADMIN — POTASSIUM CHLORIDE 40 MEQ: 1500 TABLET, EXTENDED RELEASE ORAL at 17:08

## 2024-04-07 RX ADMIN — ENOXAPARIN SODIUM 40 MG: 100 INJECTION SUBCUTANEOUS at 09:26

## 2024-04-07 RX ADMIN — DONEPEZIL HYDROCHLORIDE 22.5 MG: 10 TABLET ORAL at 09:26

## 2024-04-07 RX ADMIN — POTASSIUM CHLORIDE 40 MEQ: 1500 TABLET, EXTENDED RELEASE ORAL at 09:30

## 2024-04-07 RX ADMIN — CARBAMAZEPINE 200 MG: 200 TABLET ORAL at 13:17

## 2024-04-07 RX ADMIN — OXYCODONE 5 MG: 5 TABLET ORAL at 18:29

## 2024-04-07 RX ADMIN — SODIUM CHLORIDE, SODIUM LACTATE, POTASSIUM CHLORIDE, CALCIUM CHLORIDE AND DEXTROSE MONOHYDRATE 1000 ML: 5; 600; 310; 30; 20 INJECTION, SOLUTION INTRAVENOUS at 21:41

## 2024-04-07 RX ADMIN — DICYCLOMINE HYDROCHLORIDE 20 MG: 10 CAPSULE ORAL at 12:57

## 2024-04-07 RX ADMIN — CARBAMAZEPINE 200 MG: 200 TABLET ORAL at 17:08

## 2024-04-07 RX ADMIN — CARBAMAZEPINE 200 MG: 200 TABLET ORAL at 21:15

## 2024-04-07 RX ADMIN — POLYVINYL ALCOHOL 1 DROP: 14 SOLUTION/ DROPS OPHTHALMIC at 21:19

## 2024-04-07 RX ADMIN — POLYVINYL ALCOHOL 1 DROP: 14 SOLUTION/ DROPS OPHTHALMIC at 17:09

## 2024-04-07 RX ADMIN — SODIUM CHLORIDE, PRESERVATIVE FREE 40 MG: 5 INJECTION INTRAVENOUS at 21:17

## 2024-04-07 RX ADMIN — LORAZEPAM 0.5 MG: 0.5 TABLET ORAL at 09:27

## 2024-04-07 RX ADMIN — DICYCLOMINE HYDROCHLORIDE 20 MG: 10 CAPSULE ORAL at 09:29

## 2024-04-07 RX ADMIN — POLYVINYL ALCOHOL 1 DROP: 14 SOLUTION/ DROPS OPHTHALMIC at 09:35

## 2024-04-07 RX ADMIN — MAGNESIUM SULFATE HEPTAHYDRATE 4000 MG: 40 INJECTION, SOLUTION INTRAVENOUS at 09:40

## 2024-04-07 RX ADMIN — CARBAMAZEPINE 200 MG: 200 TABLET ORAL at 09:26

## 2024-04-07 RX ADMIN — ONDANSETRON 4 MG: 2 INJECTION INTRAMUSCULAR; INTRAVENOUS at 02:58

## 2024-04-07 ASSESSMENT — PAIN DESCRIPTION - LOCATION
LOCATION: ABDOMEN;SHOULDER;BACK
LOCATION: ABDOMEN

## 2024-04-07 ASSESSMENT — PAIN DESCRIPTION - DESCRIPTORS
DESCRIPTORS: ACHING;DISCOMFORT
DESCRIPTORS: ACHING;DISCOMFORT

## 2024-04-07 ASSESSMENT — PAIN SCALES - GENERAL
PAINLEVEL_OUTOF10: 6
PAINLEVEL_OUTOF10: 3
PAINLEVEL_OUTOF10: 4
PAINLEVEL_OUTOF10: 5
PAINLEVEL_OUTOF10: 4

## 2024-04-07 ASSESSMENT — PAIN DESCRIPTION - ORIENTATION
ORIENTATION: LEFT
ORIENTATION: RIGHT

## 2024-04-07 NOTE — PLAN OF CARE
Problem: Discharge Planning  Goal: Discharge to home or other facility with appropriate resources  Outcome: Progressing  Flowsheets (Taken 4/7/2024 1500)  Discharge to home or other facility with appropriate resources:   Identify barriers to discharge with patient and caregiver   Identify discharge learning needs (meds, wound care, etc)     Problem: Pain  Goal: Verbalizes/displays adequate comfort level or baseline comfort level  Outcome: Progressing  Flowsheets (Taken 4/7/2024 1500)  Verbalizes/displays adequate comfort level or baseline comfort level:   Encourage patient to monitor pain and request assistance   Assess pain using appropriate pain scale   Implement non-pharmacological measures as appropriate and evaluate response     Problem: Skin/Tissue Integrity  Goal: Absence of new skin breakdown  Description: 1.  Monitor for areas of redness and/or skin breakdown  2.  Assess vascular access sites hourly  3.  Every 4-6 hours minimum:  Change oxygen saturation probe site  4.  Every 4-6 hours:  If on nasal continuous positive airway pressure, respiratory therapy assess nares and determine need for appliance change or resting period.  Outcome: Progressing  Note: Q2 turns and wedge       Problem: Nutrition Deficit:  Goal: Optimize nutritional status  Outcome: Progressing  Flowsheets (Taken 4/7/2024 1500)  Nutrient intake appropriate for improving, restoring, or maintaining nutritional needs:   Assess nutritional status and recommend course of action   Monitor oral intake, labs, and treatment plans   Recommend appropriate diets, oral nutritional supplements, and vitamin/mineral supplements

## 2024-04-07 NOTE — PROGRESS NOTES
Progress Note    Patient Liana Salmeron  MRN: [id number]  YOB: 1955 Age: 68 y.o. Sex: female  Room: [address] [place] [address]       Admitting Physician: Mario Rodriguez MD   Date of Admission: 4/4/2024  7:33 PM   Primary Care Physician: Tristan Santiago MD     Subjective:  Liana Salmeron was seen and examined.   Patient still has some epigastric pain and nausea and bloating  Denies any vomiting    ROS:  Constitutional: Denies fever, no change in appetite  Respiratory: Denies cough or shortness of breath  Cardiovascular: Denies chest pain or edema    Objective:  Vital Signs:   Vitals:    04/07/24 0809   BP: 121/60   Pulse: 71   Resp: 15   Temp: 97.7 °F (36.5 °C)   SpO2: 98%         Physical Exam:  Constitutional: Alert and oriented x 4. No acute distress.   HEENT: Sclera anicteric, mucosal membranes moist  Cardiovascular: Regular rate and rhythm.  No murmurs.  Respiratory: Respirations nonlabored, no crepitus  GI: Abdomen nondistended, soft, and nontender.  Normal active bowel sounds.  No masses palpable.   Neurological: Gross memory appears intact.      Intake/Output:    Intake/Output Summary (Last 24 hours) at 4/7/2024 0959  Last data filed at 4/6/2024 2000  Gross per 24 hour   Intake 2056.42 ml   Output 1200 ml   Net 856.42 ml        Current Medications:  Current Facility-Administered Medications   Medication Dose Route Frequency Provider Last Rate Last Admin    magnesium sulfate 4000 mg in 100 mL IVPB premix  4,000 mg IntraVENous Once Ashlyn Hollingsworth MD 25 mL/hr at 04/07/24 0940 4,000 mg at 04/07/24 0940    potassium chloride (KLOR-CON M) extended release tablet 40 mEq  40 mEq Oral BID  Ashlyn Hollingsworth MD   40 mEq at 04/07/24 0930    lidocaine 4 % external patch 1 patch  1 patch TransDERmal Daily Tiffany Carrizales DO   1 patch at 04/07/24 0926    pantoprazole (PROTONIX) 40 mg in sodium chloride (PF) 0.9 % 10 mL injection  40 mg IntraVENous Q12H Makayla Jarrell MD   40 mg at

## 2024-04-07 NOTE — PROGRESS NOTES
General Surgery   Daily Progress Note  Patient: Liana Salmeron      CC: abdominal pain, nausea    SUBJECTIVE:   Nausea overall improving, controlled with meds. Denies vomiting. Pain controlled. Ostomy functioning.     ROS:   A 14 point review of systems was conducted, significant findings as noted above. All other systems negative.    OBJECTIVE:    PHYSICAL EXAM:    Vitals:    04/06/24 1926 04/06/24 1956 04/06/24 2314 04/07/24 0301   BP: (!) 113/53 101/60 110/65 119/66   Pulse:  61 65 66   Resp: 16 16 16 16   Temp:  97.8 °F (36.6 °C) 97.7 °F (36.5 °C) 97.8 °F (36.6 °C)   TempSrc:  Oral Oral Oral   SpO2:  98% 98%    Weight:       Height:         General appearance: Alert, no acute distress  Eyes: No scleral icterus, EOM grossly intact  Neck: Trachea midline, no JVD  Chest/Lungs:  normal effort with no accessory muscle use on RA  Cardiovascular: RRR, well perfused  Abdomen:  soft, presence of ileostomy with gas and brown stool in bag, non tender around parastomal area, non distended   Skin: Warm and dry, no rashes  Extremities: No edema, no cyanosis  Neuro: A&Ox3, no focal deficits, sensation intact    LABS:   Recent Labs     04/04/24 2018 04/06/24  0529   WBC 7.0 5.7   HGB 15.2 13.0   HCT 44.0 37.8   MCV 97.2 98.4    147        Recent Labs     04/05/24  0551 04/06/24  0529    138   K 3.3* 3.4*    101   CO2 27 28   BUN 11 4*   CREATININE <0.5* <0.5*        Recent Labs     04/04/24 2018 04/05/24  0551 04/06/24  0529   AST 30 26 23   ALT 26 24 20   BILIDIR <0.2  --   --    BILITOT 0.4 0.3 <0.2   ALKPHOS 119 99 97        Recent Labs     04/04/24  2018   LIPASE 11.0*        Recent Labs     04/05/24  0551 04/06/24  0529   PROT 5.4* 5.2*      No results for input(s): \"CKTOTAL\", \"CKMB\", \"CKMBINDEX\", \"TROPONINI\" in the last 72 hours.      ASSESSMENT & PLAN:   This is a 68 y.o. female complex hx including GERD, chronic constipation w/ prior total abdominal colectomy with ileorectal anastomosis c/b  stricturing and subsequent resection of anastomosis and creation of end ileostomy w/ need for further revisions, most recently in 2012, who is currently admitted for abdominal pain.     -no acute surgical intervention   -GI consulted, planning EGD Monday  -okay for diet as tolerated per primary  -antiemetics, pain control   -remainder of care per primary       Ashlyn Hollingsworth MD  PGY1, General Surgery  04/07/24   6:28 AM   [employer]  [phone]

## 2024-04-07 NOTE — PROGRESS NOTES
Patient AOX*4  Vitals stable   Room air    Pain controled with PRN pain meds.   Voiding clear, yellow urine, ostomy in place, producing liquid brown stool  IV fluids infusing per order   Bed locked and in low position, alarm on, wearing gripper socks when ambulating, side table and call light within reach.

## 2024-04-07 NOTE — PLAN OF CARE
Problem: Discharge Planning  Goal: Discharge to home or other facility with appropriate resources  Outcome: Progressing     Problem: Pain  Goal: Verbalizes/displays adequate comfort level or baseline comfort level  Outcome: Progressing     Problem: Safety - Adult  Goal: Free from fall injury  Outcome: Progressing     Problem: ABCDS Injury Assessment  Goal: Absence of physical injury  Outcome: Progressing     Problem: Skin/Tissue Integrity  Goal: Absence of new skin breakdown  Description: 1.  Monitor for areas of redness and/or skin breakdown  2.  Assess vascular access sites hourly  3.  Every 4-6 hours minimum:  Change oxygen saturation probe site  4.  Every 4-6 hours:  If on nasal continuous positive airway pressure, respiratory therapy assess nares and determine need for appliance change or resting period.  Outcome: Progressing     Problem: Nutrition Deficit:  Goal: Optimize nutritional status  Outcome: Progressing
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
## 2024-04-07 NOTE — PROGRESS NOTES
Pt A&O x4, VSS. Purewick in place. Ileostomy bag draining adequately, all appliances replaced yesterday. Pt had c/o nausea - zofran given. Pt had c/o pain, PRN pain medication given per orders. Q2 turn schedule continued this shift. No needs at this time, call light within reach.     Electronically signed by Chan Chao RN on 4/7/2024 at 4:23 PM

## 2024-04-08 ENCOUNTER — ANESTHESIA EVENT (OUTPATIENT)
Dept: ENDOSCOPY | Age: 69
End: 2024-04-08
Payer: MEDICARE

## 2024-04-08 ENCOUNTER — ANESTHESIA (OUTPATIENT)
Dept: ENDOSCOPY | Age: 69
End: 2024-04-08
Payer: MEDICARE

## 2024-04-08 PROBLEM — F43.10 PTSD (POST-TRAUMATIC STRESS DISORDER): Status: ACTIVE | Noted: 2024-04-08

## 2024-04-08 LAB
ALBUMIN SERPL-MCNC: 3.1 G/DL (ref 3.4–5)
ALP SERPL-CCNC: 110 U/L (ref 40–129)
ALT SERPL-CCNC: 18 U/L (ref 10–40)
ANION GAP SERPL CALCULATED.3IONS-SCNC: 7 MMOL/L (ref 3–16)
AST SERPL-CCNC: 21 U/L (ref 15–37)
BASOPHILS # BLD: 0 K/UL (ref 0–0.2)
BASOPHILS NFR BLD: 0.5 %
BILIRUB DIRECT SERPL-MCNC: <0.2 MG/DL (ref 0–0.3)
BILIRUB INDIRECT SERPL-MCNC: ABNORMAL MG/DL (ref 0–1)
BILIRUB SERPL-MCNC: 0.3 MG/DL (ref 0–1)
BUN SERPL-MCNC: <2 MG/DL (ref 7–20)
CALCIUM SERPL-MCNC: 8 MG/DL (ref 8.3–10.6)
CHLORIDE SERPL-SCNC: 94 MMOL/L (ref 99–110)
CO2 SERPL-SCNC: 27 MMOL/L (ref 21–32)
CREAT SERPL-MCNC: <0.5 MG/DL (ref 0.6–1.2)
DEPRECATED RDW RBC AUTO: 13 % (ref 12.4–15.4)
EKG ATRIAL RATE: 63 BPM
EKG DIAGNOSIS: NORMAL
EKG P AXIS: -16 DEGREES
EKG P-R INTERVAL: 160 MS
EKG Q-T INTERVAL: 404 MS
EKG QRS DURATION: 66 MS
EKG QTC CALCULATION (BAZETT): 413 MS
EKG R AXIS: 54 DEGREES
EKG T AXIS: 38 DEGREES
EKG VENTRICULAR RATE: 63 BPM
EOSINOPHIL # BLD: 0.3 K/UL (ref 0–0.6)
EOSINOPHIL NFR BLD: 5.4 %
GFR SERPLBLD CREATININE-BSD FMLA CKD-EPI: >90 ML/MIN/{1.73_M2}
GLUCOSE BLD-MCNC: 86 MG/DL (ref 70–99)
GLUCOSE BLD-MCNC: 86 MG/DL (ref 70–99)
GLUCOSE BLD-MCNC: 97 MG/DL (ref 70–99)
GLUCOSE BLD-MCNC: 99 MG/DL (ref 70–99)
GLUCOSE SERPL-MCNC: 74 MG/DL (ref 70–99)
HCT VFR BLD AUTO: 39.3 % (ref 36–48)
HGB BLD-MCNC: 13.5 G/DL (ref 12–16)
INR PPP: 1.1 (ref 0.85–1.15)
LYMPHOCYTES # BLD: 1 K/UL (ref 1–5.1)
LYMPHOCYTES NFR BLD: 19.3 %
MAGNESIUM SERPL-MCNC: 1.6 MG/DL (ref 1.8–2.4)
MCH RBC QN AUTO: 33.3 PG (ref 26–34)
MCHC RBC AUTO-ENTMCNC: 34.4 G/DL (ref 31–36)
MCV RBC AUTO: 96.9 FL (ref 80–100)
MONOCYTES # BLD: 0.7 K/UL (ref 0–1.3)
MONOCYTES NFR BLD: 13.2 %
NEUTROPHILS # BLD: 3.3 K/UL (ref 1.7–7.7)
NEUTROPHILS NFR BLD: 61.6 %
PERFORMED ON: NORMAL
PHOSPHATE SERPL-MCNC: 1.2 MG/DL (ref 2.5–4.9)
PLATELET # BLD AUTO: 131 K/UL (ref 135–450)
PMV BLD AUTO: 10 FL (ref 5–10.5)
POTASSIUM SERPL-SCNC: 3.8 MMOL/L (ref 3.5–5.1)
PROT SERPL-MCNC: 5.1 G/DL (ref 6.4–8.2)
PROTHROMBIN TIME: 14.4 SEC (ref 11.9–14.9)
RBC # BLD AUTO: 4.06 M/UL (ref 4–5.2)
SODIUM SERPL-SCNC: 128 MMOL/L (ref 136–145)
TRANSFERRIN SERPL-MCNC: 147 MG/DL (ref 200–360)
WBC # BLD AUTO: 5.3 K/UL (ref 4–11)

## 2024-04-08 PROCEDURE — C9113 INJ PANTOPRAZOLE SODIUM, VIA: HCPCS | Performed by: INTERNAL MEDICINE

## 2024-04-08 PROCEDURE — 6370000000 HC RX 637 (ALT 250 FOR IP): Performed by: INTERNAL MEDICINE

## 2024-04-08 PROCEDURE — 85610 PROTHROMBIN TIME: CPT

## 2024-04-08 PROCEDURE — 93010 ELECTROCARDIOGRAM REPORT: CPT | Performed by: INTERNAL MEDICINE

## 2024-04-08 PROCEDURE — 3700000000 HC ANESTHESIA ATTENDED CARE: Performed by: INTERNAL MEDICINE

## 2024-04-08 PROCEDURE — 2580000003 HC RX 258: Performed by: FAMILY MEDICINE

## 2024-04-08 PROCEDURE — 2709999900 HC NON-CHARGEABLE SUPPLY: Performed by: INTERNAL MEDICINE

## 2024-04-08 PROCEDURE — 2500000003 HC RX 250 WO HCPCS: Performed by: STUDENT IN AN ORGANIZED HEALTH CARE EDUCATION/TRAINING PROGRAM

## 2024-04-08 PROCEDURE — 93005 ELECTROCARDIOGRAM TRACING: CPT | Performed by: STUDENT IN AN ORGANIZED HEALTH CARE EDUCATION/TRAINING PROGRAM

## 2024-04-08 PROCEDURE — 0DB58ZX EXCISION OF ESOPHAGUS, VIA NATURAL OR ARTIFICIAL OPENING ENDOSCOPIC, DIAGNOSTIC: ICD-10-PCS | Performed by: INTERNAL MEDICINE

## 2024-04-08 PROCEDURE — 99231 SBSQ HOSP IP/OBS SF/LOW 25: CPT | Performed by: SURGERY

## 2024-04-08 PROCEDURE — 6360000002 HC RX W HCPCS: Performed by: STUDENT IN AN ORGANIZED HEALTH CARE EDUCATION/TRAINING PROGRAM

## 2024-04-08 PROCEDURE — A4216 STERILE WATER/SALINE, 10 ML: HCPCS | Performed by: INTERNAL MEDICINE

## 2024-04-08 PROCEDURE — 80076 HEPATIC FUNCTION PANEL: CPT

## 2024-04-08 PROCEDURE — 6360000002 HC RX W HCPCS: Performed by: INTERNAL MEDICINE

## 2024-04-08 PROCEDURE — 36415 COLL VENOUS BLD VENIPUNCTURE: CPT

## 2024-04-08 PROCEDURE — 6360000002 HC RX W HCPCS

## 2024-04-08 PROCEDURE — 88312 SPECIAL STAINS GROUP 1: CPT

## 2024-04-08 PROCEDURE — 7100000011 HC PHASE II RECOVERY - ADDTL 15 MIN: Performed by: INTERNAL MEDICINE

## 2024-04-08 PROCEDURE — 2500000003 HC RX 250 WO HCPCS

## 2024-04-08 PROCEDURE — 2580000003 HC RX 258: Performed by: INTERNAL MEDICINE

## 2024-04-08 PROCEDURE — 2580000003 HC RX 258: Performed by: STUDENT IN AN ORGANIZED HEALTH CARE EDUCATION/TRAINING PROGRAM

## 2024-04-08 PROCEDURE — 88305 TISSUE EXAM BY PATHOLOGIST: CPT

## 2024-04-08 PROCEDURE — 6370000000 HC RX 637 (ALT 250 FOR IP): Performed by: STUDENT IN AN ORGANIZED HEALTH CARE EDUCATION/TRAINING PROGRAM

## 2024-04-08 PROCEDURE — 7100000010 HC PHASE II RECOVERY - FIRST 15 MIN: Performed by: INTERNAL MEDICINE

## 2024-04-08 PROCEDURE — 99222 1ST HOSP IP/OBS MODERATE 55: CPT | Performed by: NURSE PRACTITIONER

## 2024-04-08 PROCEDURE — 6370000000 HC RX 637 (ALT 250 FOR IP): Performed by: NURSE PRACTITIONER

## 2024-04-08 PROCEDURE — 80069 RENAL FUNCTION PANEL: CPT

## 2024-04-08 PROCEDURE — 3609012400 HC EGD TRANSORAL BIOPSY SINGLE/MULTIPLE: Performed by: INTERNAL MEDICINE

## 2024-04-08 PROCEDURE — 0DB68ZX EXCISION OF STOMACH, VIA NATURAL OR ARTIFICIAL OPENING ENDOSCOPIC, DIAGNOSTIC: ICD-10-PCS | Performed by: INTERNAL MEDICINE

## 2024-04-08 PROCEDURE — 1200000000 HC SEMI PRIVATE

## 2024-04-08 PROCEDURE — 6370000000 HC RX 637 (ALT 250 FOR IP)

## 2024-04-08 PROCEDURE — 85025 COMPLETE CBC W/AUTO DIFF WBC: CPT

## 2024-04-08 PROCEDURE — 83735 ASSAY OF MAGNESIUM: CPT

## 2024-04-08 RX ORDER — LIDOCAINE HYDROCHLORIDE 20 MG/ML
INJECTION, SOLUTION EPIDURAL; INFILTRATION; INTRACAUDAL; PERINEURAL PRN
Status: DISCONTINUED | OUTPATIENT
Start: 2024-04-08 | End: 2024-04-08 | Stop reason: SDUPTHER

## 2024-04-08 RX ORDER — PROPOFOL 10 MG/ML
INJECTION, EMULSION INTRAVENOUS PRN
Status: DISCONTINUED | OUTPATIENT
Start: 2024-04-08 | End: 2024-04-08 | Stop reason: SDUPTHER

## 2024-04-08 RX ORDER — TEMAZEPAM 15 MG/1
15 CAPSULE ORAL NIGHTLY
Status: DISCONTINUED | OUTPATIENT
Start: 2024-04-08 | End: 2024-04-13 | Stop reason: HOSPADM

## 2024-04-08 RX ORDER — HALOPERIDOL 5 MG/ML
1 INJECTION INTRAMUSCULAR EVERY 6 HOURS PRN
Status: DISCONTINUED | OUTPATIENT
Start: 2024-04-08 | End: 2024-04-13 | Stop reason: HOSPADM

## 2024-04-08 RX ORDER — FLUOXETINE HYDROCHLORIDE 20 MG/1
20 CAPSULE ORAL DAILY
Status: DISCONTINUED | OUTPATIENT
Start: 2024-04-08 | End: 2024-04-13 | Stop reason: HOSPADM

## 2024-04-08 RX ADMIN — LORAZEPAM 0.5 MG: 0.5 TABLET ORAL at 21:10

## 2024-04-08 RX ADMIN — SODIUM PHOSPHATE, MONOBASIC, MONOHYDRATE AND SODIUM PHOSPHATE, DIBASIC, ANHYDROUS 30 MMOL: 142; 276 INJECTION, SOLUTION INTRAVENOUS at 17:02

## 2024-04-08 RX ADMIN — DONEPEZIL HYDROCHLORIDE 22.5 MG: 10 TABLET ORAL at 08:09

## 2024-04-08 RX ADMIN — CARBAMAZEPINE 200 MG: 200 TABLET ORAL at 08:08

## 2024-04-08 RX ADMIN — CARBAMAZEPINE 200 MG: 200 TABLET ORAL at 21:11

## 2024-04-08 RX ADMIN — WATER 1000 MG: 1 INJECTION INTRAMUSCULAR; INTRAVENOUS; SUBCUTANEOUS at 01:01

## 2024-04-08 RX ADMIN — CARBAMAZEPINE 200 MG: 200 TABLET ORAL at 17:07

## 2024-04-08 RX ADMIN — SODIUM CHLORIDE, PRESERVATIVE FREE 40 MG: 5 INJECTION INTRAVENOUS at 21:10

## 2024-04-08 RX ADMIN — SODIUM CHLORIDE, SODIUM LACTATE, POTASSIUM CHLORIDE, CALCIUM CHLORIDE AND DEXTROSE MONOHYDRATE 1000 ML: 5; 600; 310; 30; 20 INJECTION, SOLUTION INTRAVENOUS at 11:04

## 2024-04-08 RX ADMIN — POLYVINYL ALCOHOL 1 DROP: 14 SOLUTION/ DROPS OPHTHALMIC at 15:38

## 2024-04-08 RX ADMIN — DICYCLOMINE HYDROCHLORIDE 20 MG: 10 CAPSULE ORAL at 21:10

## 2024-04-08 RX ADMIN — LORAZEPAM 0.5 MG: 0.5 TABLET ORAL at 08:09

## 2024-04-08 RX ADMIN — POLYVINYL ALCOHOL 1 DROP: 14 SOLUTION/ DROPS OPHTHALMIC at 21:12

## 2024-04-08 RX ADMIN — ONDANSETRON 4 MG: 2 INJECTION INTRAMUSCULAR; INTRAVENOUS at 17:06

## 2024-04-08 RX ADMIN — LIDOCAINE HYDROCHLORIDE 50 MG: 20 INJECTION, SOLUTION EPIDURAL; INFILTRATION; INTRACAUDAL; PERINEURAL at 14:38

## 2024-04-08 RX ADMIN — PROPOFOL 50 MG: 10 INJECTION, EMULSION INTRAVENOUS at 14:38

## 2024-04-08 RX ADMIN — DICYCLOMINE HYDROCHLORIDE 20 MG: 10 CAPSULE ORAL at 08:08

## 2024-04-08 RX ADMIN — SODIUM CHLORIDE, PRESERVATIVE FREE 40 MG: 5 INJECTION INTRAVENOUS at 08:08

## 2024-04-08 RX ADMIN — WATER 1000 MG: 1 INJECTION INTRAMUSCULAR; INTRAVENOUS; SUBCUTANEOUS at 23:09

## 2024-04-08 RX ADMIN — NYSTATIN 500000 UNITS: 100000 SUSPENSION ORAL at 17:06

## 2024-04-08 RX ADMIN — FLUOXETINE HYDROCHLORIDE 20 MG: 20 CAPSULE ORAL at 15:38

## 2024-04-08 RX ADMIN — DICYCLOMINE HYDROCHLORIDE 20 MG: 10 CAPSULE ORAL at 15:38

## 2024-04-08 RX ADMIN — NYSTATIN 500000 UNITS: 100000 SUSPENSION ORAL at 21:10

## 2024-04-08 RX ADMIN — POLYVINYL ALCOHOL 1 DROP: 14 SOLUTION/ DROPS OPHTHALMIC at 08:09

## 2024-04-08 RX ADMIN — SODIUM CHLORIDE, PRESERVATIVE FREE 10 ML: 5 INJECTION INTRAVENOUS at 08:08

## 2024-04-08 RX ADMIN — TEMAZEPAM 15 MG: 15 CAPSULE ORAL at 23:13

## 2024-04-08 ASSESSMENT — PAIN SCALES - GENERAL
PAINLEVEL_OUTOF10: 3
PAINLEVEL_OUTOF10: 2

## 2024-04-08 ASSESSMENT — PAIN - FUNCTIONAL ASSESSMENT
PAIN_FUNCTIONAL_ASSESSMENT: WONG-BAKER FACES

## 2024-04-08 ASSESSMENT — PAIN DESCRIPTION - LOCATION: LOCATION: ABDOMEN

## 2024-04-08 ASSESSMENT — PAIN DESCRIPTION - DESCRIPTORS: DESCRIPTORS: ACHING;DISCOMFORT

## 2024-04-08 ASSESSMENT — PAIN DESCRIPTION - PAIN TYPE: TYPE: ACUTE PAIN

## 2024-04-08 ASSESSMENT — PAIN DESCRIPTION - ORIENTATION: ORIENTATION: LEFT;MID

## 2024-04-08 ASSESSMENT — PAIN DESCRIPTION - ONSET: ONSET: ON-GOING
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
## 2024-04-08 NOTE — PROGRESS NOTES
retroperitoneal or pelvic adenopathy is seen. VESSELS: Aorta and IVC are unremarkable. Celiac and SMA are patent.  The portal vein and superior patent. Splenic vein and SMV are normal. REPRODUCTIVE ORGANS: The uterus is heterogeneous. There is fluid identified in the vaginal vault. URINARY BLADDER: Multiple calcifications are seen layering in the dependent portion of the bladder. ABDOMINAL WALL: Ostomy site is seen in the right lower quadrant. BONES: There is evidence of prior kyphoplasty at L1. Hypertrophic ossification is seen at the right hip OTHER FINDINGS: None.     There is no evidence of bowel obstruction. Patient has had a total colectomy no evidence of a Hines's pouch. A rectal wall thickening is noted. Multiple bladder stones are identified. There is fluid identified in the vaginal vault of uncertain etiology. Electronically signed by Deysi Haddad MD      CBC:   Recent Labs     04/06/24  0529 04/07/24  0609   WBC 5.7 4.2   HGB 13.0 14.0    149       BMP:    Recent Labs     04/06/24  0529 04/07/24  0609    133*   K 3.4* 3.3*    96*   CO2 28 29   BUN 4* <2*   CREATININE <0.5* <0.5*   GLUCOSE 123* 88       Hepatic:   Recent Labs     04/06/24  0529 04/07/24  0609   AST 23 22   ALT 20 20   BILITOT <0.2 0.3   ALKPHOS 97 113       Lipids:   Lab Results   Component Value Date/Time    CHOL 188 03/28/2014 01:45 PM    HDL 84 03/28/2014 01:45 PM    TRIG 84 03/28/2014 01:45 PM     Hemoglobin A1C: No results found for: \"LABA1C\"  TSH: No results found for: \"TSH\"  Troponin: No results found for: \"TROPONINT\"  Lactic Acid:   No results for input(s): \"LACTA\" in the last 72 hours.    BNP: No results for input(s): \"PROBNP\" in the last 72 hours.  UA:  Lab Results   Component Value Date/Time    NITRU POSITIVE 04/04/2024 08:12 PM    COLORU Yellow 04/04/2024 08:12 PM    PHUR 6.0 04/04/2024 08:12 PM    WBCUA 6-9 04/04/2024 08:12 PM    RBCUA 0-2 04/04/2024 08:12 PM    BACTERIA 2+ 04/04/2024 08:12 PM     CLARITYU Clear 04/04/2024 08:12 PM    SPECGRAV 1.025 04/04/2024 08:12 PM    LEUKOCYTESUR TRACE 04/04/2024 08:12 PM    UROBILINOGEN 0.2 04/04/2024 08:12 PM    BILIRUBINUR Negative 04/04/2024 08:12 PM    BLOODU Negative 04/04/2024 08:12 PM    GLUCOSEU Negative 04/04/2024 08:12 PM    KETUA >=80 04/04/2024 08:12 PM     Urine Cultures:   Lab Results   Component Value Date/Time    LABURIN >100,000 CFU/ml 04/04/2024 08:12 PM     Blood Cultures:   Lab Results   Component Value Date/Time    BC  04/05/2024 03:17 AM     No Growth to date.  Any change in status will be called.     Lab Results   Component Value Date/Time    BLOODCULT2  04/05/2024 03:17 AM     No Growth to date.  Any change in status will be called.     Organism:   Lab Results   Component Value Date/Time    ORG Klebsiella pneumoniae 04/04/2024 08:12 PM         Electronically signed by Chaz Lindsey MD on 4/8/2024 at 9:31 AM

## 2024-04-08 NOTE — PROCEDURES
EGD REPORT    Patient:  Liana Salmeron                  1955    Referring Physician:  César    Endoscopist: South     Indication:  abdominal pain     Medications:  MAC      Pre-Anesthesia Assessment:  I have reviewed and am in agreement with patient history and medication, including previous response to sedation.    Prior to the procedure, a History and Physical was performed, and patient medications and allergies were reviewed. The patient is competent. The risks and benefits of the procedure and the sedation options and risks were discussed with the patient.   Risks discussed included but were not limited to infection, bleeding, perforation, death, and missed lesions.  All questions were answered and informed consent was obtained. Patient identification and proposed procedure were verified by the physician and the nurse in the pre-procedure area in the procedure room.     Mallampatti: II  ASA Grade Assessment: 3     After reviewing the risks and benefits, the patient was deemed in satisfactory condition to undergo the procedure. The anesthesia plan was to use MAC anesthesia. Immediately prior to administration of medications, the patient was re-assessed for adequacy to receive sedatives and a time out was performed. Patient and healthcare providers were in agreement it was the correct patient and procedure. The heart rate, respiratory rate, oxygen saturations, blood pressure, adequacy of pulmonary ventilation, and response to care were monitored throughout the procedure. The physical status of the patient was re-assessed after the procedure.     After obtaining informed consent, the endoscope was passed under direct vision.   The endoscope was introduced through the mouth, and advanced to the second part of duodenum. The EGD was accomplished without difficulty. The patient tolerated the procedure well.       Duodenum: Normal; normal major papilla  Stomach: Normal. Biopsies obtained.  Retroflexion

## 2024-04-08 NOTE — CARE COORDINATION
Patient is a long term care resident at [facility] and is a paid bed hold. She is able to return there at d/c and will need transport.     Patient going for EGD today. Psych recommended outpatient Psych at d/c and placed this on her MARIAH.     Electronically signed by Corazon Gorman RN on 4/8/2024 at 11:43 AM  [phone]

## 2024-04-08 NOTE — PROGRESS NOTES
Patients Caroline juarez, called and provided with update. Electronically signed by Ara Levy RN on 4/8/2024 at 12:16 PM     Test Reason : 

Blood Pressure : ***/*** mmHG

Vent. Rate : 075 BPM     Atrial Rate : 075 BPM

   P-R Int : 126 ms          QRS Dur : 080 ms

    QT Int : 370 ms       P-R-T Axes : 001 076 026 degrees

   QTc Int : 413 ms

 

NORMAL SINUS RHYTHM

NONSPECIFIC T WAVE ABNORMALITY

ABNORMAL ECG

WHEN COMPARED WITH ECG OF 28-APR-2018 01:01,

NONSPECIFIC T WAVE ABNORMALITY NOW EVIDENT IN LATERAL LEADS

Confirmed by KATIE CHEN MD ([id number]) on 6/3/2018 10:42:24 PM

 

Referred By:             Confirmed By:KATIE CHEN MD

## 2024-04-08 NOTE — DISCHARGE INSTR - COC
Nurse Assessment:  Last Vital Signs: /68   Pulse 90   Temp 98.1 °F (36.7 °C) (Oral)   Resp 14   Ht 1.6 m (5' 3\")   Wt 64 kg (141 lb 3.2 oz)   SpO2 98%   BMI 25.01 kg/m²     Last documented pain score (0-10 scale): Pain Level: 4  Last Weight:   Wt Readings from Last 1 Encounters:   04/04/24 64 kg (141 lb 3.2 oz)     Mental Status:  {IP PT MENTAL STATUS:20030}    IV Access:  { MARIAH IV ACCESS:555983154}    Nursing Mobility/ADLs:  Walking   {CHP DME ADLs:941020451}  Transfer  {CHP DME ADLs:198696976}  Bathing  {CHP DME ADLs:398655548}  Dressing  {CHP DME ADLs:190457685}  Toileting  {CHP DME ADLs:095835166}  Feeding  {P DME ADLs:496128988}  Med Admin  {P DME ADLs:577155688}  Med Delivery   { MARIAH MED Delivery:930141783}    Wound Care Documentation and Therapy:        Elimination:  Continence:   Bowel: {YES / NO:19727}  Bladder: {YES / NO:19727}  Urinary Catheter: {Urinary Catheter:541275479}   Colostomy/Ileostomy/Ileal Conduit: {YES / NO:19727}  Ileostomy/Jejunostomy RLQ Ileostomy-Stomal Appliance: 2 piece, Clean, dry & intact  Ileostomy/Jejunostomy RLQ Ileostomy-Stoma  Assessment: Pink  Ileostomy/Jejunostomy RLQ Ileostomy-Peristomal Assessment: Clean, dry & intact  Ileostomy/Jejunostomy RLQ Ileostomy-Treatment: Stoma powder  Ileostomy/Jejunostomy RLQ Ileostomy-Stool Appearance: Loose, Watery  Ileostomy/Jejunostomy RLQ Ileostomy-Stool Color: Brown  Ileostomy/Jejunostomy RLQ Ileostomy-Stool Amount: Large  Ileostomy/Jejunostomy RLQ Ileostomy-Output (mL): 500 ml    Date of Last BM: ***    Intake/Output Summary (Last 24 hours) at 4/8/2024 1140  Last data filed at 4/8/2024 0807  Gross per 24 hour   Intake 700 ml   Output 1550 ml   Net -850 ml     I/O last 3 completed shifts:  In: 2636.4 [P.O.:1180; I.V.:1456.4]  Out: 2250 [Urine:1600; Stool:650]    Safety Concerns:     { MARIAH Safety Concerns:676564214}    Impairments/Disabilities:      {[facility] Impairments/Disabilities:787669661}    Nutrition  Therapy:  Current Nutrition Therapy:   { MARIAH Diet List:510334702}    Routes of Feeding: {[facility] DME Other Feedings:397135526}  Liquids: {Slp liquid thickness:01060}  Daily Fluid Restriction: {P DME Yes amt example:734627546}  Last Modified Barium Swallow with Video (Video Swallowing Test): {Done Not Done Date:}    Treatments at the Time of Hospital Discharge:   Respiratory Treatments: ***  Oxygen Therapy:  {Therapy; copd oxygen:99575}  Ventilator:    { CC Vent List:337169180}    Rehab Therapies: {THERAPEUTIC INTERVENTION:6336686188}  Weight Bearing Status/Restrictions: {[facility] Weight Bearin}  Other Medical Equipment (for information only, NOT a DME order):  {EQUIPMENT:376083693}  Other Treatments: ***    Patient's personal belongings (please select all that are sent with patient):  {[facility] DME Belongings:519572146}    RN SIGNATURE:  {Esignature:534220755}    CASE MANAGEMENT/SOCIAL WORK SECTION    Inpatient Status Date: ***    Readmission Risk Assessment Score:  Readmission Risk              Risk of Unplanned Readmission:  16           Discharging to Facility/ Agency   Name: [facility]   Phone: [phone]  Fax: [phone]      / signature: Electronically signed by Cata Barlow RN on 24 at 1:01 PM EDT    PHYSICIAN SECTION    Prognosis: Fair    Condition at Discharge: Stable    Rehab Potential (if transferring to Rehab): Fair    Recommended Labs or Other Treatments After Discharge:   Recommend continued follow-up outpatient with psychiatry and psychotherapist. Cognitive behavioral therapy for chronic pain is an indicated treatment for all patients with poorly controlled chronic pain that could be utilized by therapist.   Follow-up BMP in 2 weeks by PCP/nephrology  Follow-up EGD biopsy results with GI as    NUTRITION RECOMMENDATIONS:  PO Diet: Continue CLD- ADAT to FLD (pt baseline)  ONS: Start Ensure Clear TID  Once diet adv to FLD, start Ensure +HP TID (pt

## 2024-04-08 NOTE — PROGRESS NOTES
General Surgery   Daily Progress Note  Patient: Liana Salmeron      CC: abdominal pain, nausea    SUBJECTIVE:   No acute events overnight. Pain is stable. Nausea is controlled with medications. Tolerating a diet and having ostomy output.     ROS:   A 14 point review of systems was conducted, significant findings as noted above. All other systems negative.    OBJECTIVE:    PHYSICAL EXAM:    Vitals:    04/07/24 1859 04/07/24 2015 04/07/24 2254 04/08/24 0246   BP:  115/65 121/64 (!) 96/52   Pulse:  74 80 65   Resp: 15 16 17 18   Temp:  98.3 °F (36.8 °C) 97.3 °F (36.3 °C) 97.9 °F (36.6 °C)   TempSrc:  Oral Oral Oral   SpO2:  97% 95% 95%   Weight:       Height:         General appearance: Alert, no acute distress  Eyes: No scleral icterus, EOM grossly intact  Neck: Trachea midline, no JVD  Chest/Lungs:  normal effort with no accessory muscle use on RA  Cardiovascular: RRR, well perfused  Abdomen:  soft, presence of ileostomy with gas and brown stool in bag, non tender around parastomal area, non distended   Skin: Warm and dry, no rashes  Extremities: No edema, no cyanosis  Neuro: A&Ox3, no focal deficits, sensation intact    LABS:   Recent Labs     04/06/24  0529 04/07/24  0609   WBC 5.7 4.2   HGB 13.0 14.0   HCT 37.8 40.9   MCV 98.4 97.7    149          Recent Labs     04/06/24  0529 04/07/24  0609    133*   K 3.4* 3.3*    96*   CO2 28 29   BUN 4* <2*   CREATININE <0.5* <0.5*          Recent Labs     04/06/24  0529 04/07/24  0609   AST 23 22   ALT 20 20   BILITOT <0.2 0.3   ALKPHOS 97 113          No results for input(s): \"LIPASE\", \"AMYLASE\" in the last 72 hours.       Recent Labs     04/06/24  0529 04/07/24  0609   PROT 5.2* 5.7*        No results for input(s): \"CKTOTAL\", \"CKMB\", \"CKMBINDEX\", \"TROPONINI\" in the last 72 hours.      ASSESSMENT & PLAN:   This is a 68 y.o. female complex hx including GERD, chronic constipation w/ prior total abdominal colectomy with ileorectal anastomosis c/b
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
## 2024-04-09 PROBLEM — Z51.5 ENCOUNTER FOR PALLIATIVE CARE: Status: ACTIVE | Noted: 2024-04-09

## 2024-04-09 PROBLEM — Z71.89 ADVANCE CARE PLANNING: Status: ACTIVE | Noted: 2024-04-09

## 2024-04-09 LAB
ALBUMIN SERPL-MCNC: 2.9 G/DL (ref 3.4–5)
ALP SERPL-CCNC: 103 U/L (ref 40–129)
ALT SERPL-CCNC: 17 U/L (ref 10–40)
ANION GAP SERPL CALCULATED.3IONS-SCNC: 9 MMOL/L (ref 3–16)
AST SERPL-CCNC: 25 U/L (ref 15–37)
BACTERIA BLD CULT ORG #2: NORMAL
BACTERIA BLD CULT: NORMAL
BASOPHILS # BLD: 0 K/UL (ref 0–0.2)
BASOPHILS NFR BLD: 0.6 %
BILIRUB DIRECT SERPL-MCNC: <0.2 MG/DL (ref 0–0.3)
BILIRUB INDIRECT SERPL-MCNC: ABNORMAL MG/DL (ref 0–1)
BILIRUB SERPL-MCNC: <0.2 MG/DL (ref 0–1)
BUN SERPL-MCNC: <2 MG/DL (ref 7–20)
CALCIUM SERPL-MCNC: 7.5 MG/DL (ref 8.3–10.6)
CHLORIDE SERPL-SCNC: 93 MMOL/L (ref 99–110)
CO2 SERPL-SCNC: 27 MMOL/L (ref 21–32)
CREAT SERPL-MCNC: <0.5 MG/DL (ref 0.6–1.2)
DEPRECATED RDW RBC AUTO: 12.6 % (ref 12.4–15.4)
EOSINOPHIL # BLD: 0.3 K/UL (ref 0–0.6)
EOSINOPHIL NFR BLD: 8.2 %
GFR SERPLBLD CREATININE-BSD FMLA CKD-EPI: >90 ML/MIN/{1.73_M2}
GLUCOSE BLD-MCNC: 81 MG/DL (ref 70–99)
GLUCOSE BLD-MCNC: 88 MG/DL (ref 70–99)
GLUCOSE BLD-MCNC: 93 MG/DL (ref 70–99)
GLUCOSE BLD-MCNC: 95 MG/DL (ref 70–99)
GLUCOSE SERPL-MCNC: 96 MG/DL (ref 70–99)
HCT VFR BLD AUTO: 34.8 % (ref 36–48)
HGB BLD-MCNC: 12.4 G/DL (ref 12–16)
LYMPHOCYTES # BLD: 1 K/UL (ref 1–5.1)
LYMPHOCYTES NFR BLD: 23.3 %
MAGNESIUM SERPL-MCNC: 1.3 MG/DL (ref 1.8–2.4)
MCH RBC QN AUTO: 33.8 PG (ref 26–34)
MCHC RBC AUTO-ENTMCNC: 35.6 G/DL (ref 31–36)
MCV RBC AUTO: 94.9 FL (ref 80–100)
MONOCYTES # BLD: 0.6 K/UL (ref 0–1.3)
MONOCYTES NFR BLD: 15.2 %
NEUTROPHILS # BLD: 2.2 K/UL (ref 1.7–7.7)
NEUTROPHILS NFR BLD: 52.7 %
PERFORMED ON: NORMAL
PHOSPHATE SERPL-MCNC: 2.9 MG/DL (ref 2.5–4.9)
PLATELET # BLD AUTO: 123 K/UL (ref 135–450)
PMV BLD AUTO: 9.4 FL (ref 5–10.5)
POTASSIUM SERPL-SCNC: 3.4 MMOL/L (ref 3.5–5.1)
PROT SERPL-MCNC: 4.8 G/DL (ref 6.4–8.2)
RBC # BLD AUTO: 3.66 M/UL (ref 4–5.2)
SODIUM SERPL-SCNC: 129 MMOL/L (ref 136–145)
WBC # BLD AUTO: 4.1 K/UL (ref 4–11)

## 2024-04-09 PROCEDURE — 2580000003 HC RX 258: Performed by: INTERNAL MEDICINE

## 2024-04-09 PROCEDURE — 6370000000 HC RX 637 (ALT 250 FOR IP): Performed by: NURSE PRACTITIONER

## 2024-04-09 PROCEDURE — 36591 DRAW BLOOD OFF VENOUS DEVICE: CPT

## 2024-04-09 PROCEDURE — 6370000000 HC RX 637 (ALT 250 FOR IP): Performed by: INTERNAL MEDICINE

## 2024-04-09 PROCEDURE — C9113 INJ PANTOPRAZOLE SODIUM, VIA: HCPCS | Performed by: INTERNAL MEDICINE

## 2024-04-09 PROCEDURE — 6360000002 HC RX W HCPCS: Performed by: INTERNAL MEDICINE

## 2024-04-09 PROCEDURE — 6360000002 HC RX W HCPCS: Performed by: STUDENT IN AN ORGANIZED HEALTH CARE EDUCATION/TRAINING PROGRAM

## 2024-04-09 PROCEDURE — A4216 STERILE WATER/SALINE, 10 ML: HCPCS | Performed by: INTERNAL MEDICINE

## 2024-04-09 PROCEDURE — 80069 RENAL FUNCTION PANEL: CPT

## 2024-04-09 PROCEDURE — 6370000000 HC RX 637 (ALT 250 FOR IP): Performed by: STUDENT IN AN ORGANIZED HEALTH CARE EDUCATION/TRAINING PROGRAM

## 2024-04-09 PROCEDURE — 99231 SBSQ HOSP IP/OBS SF/LOW 25: CPT | Performed by: SURGERY

## 2024-04-09 PROCEDURE — 83735 ASSAY OF MAGNESIUM: CPT

## 2024-04-09 PROCEDURE — 80076 HEPATIC FUNCTION PANEL: CPT

## 2024-04-09 PROCEDURE — 85025 COMPLETE CBC W/AUTO DIFF WBC: CPT

## 2024-04-09 PROCEDURE — 1200000000 HC SEMI PRIVATE

## 2024-04-09 PROCEDURE — 99221 1ST HOSP IP/OBS SF/LOW 40: CPT | Performed by: NURSE PRACTITIONER

## 2024-04-09 RX ORDER — SIMETHICONE 40MG/0.6ML
40 SUSPENSION, DROPS(FINAL DOSAGE FORM)(ML) ORAL 4 TIMES DAILY
Status: DISCONTINUED | OUTPATIENT
Start: 2024-04-09 | End: 2024-04-13 | Stop reason: HOSPADM

## 2024-04-09 RX ORDER — MAGNESIUM SULFATE IN WATER 40 MG/ML
2000 INJECTION, SOLUTION INTRAVENOUS ONCE
Status: COMPLETED | OUTPATIENT
Start: 2024-04-09 | End: 2024-04-09

## 2024-04-09 RX ADMIN — ENOXAPARIN SODIUM 40 MG: 100 INJECTION SUBCUTANEOUS at 07:52

## 2024-04-09 RX ADMIN — FLUOXETINE HYDROCHLORIDE 20 MG: 20 CAPSULE ORAL at 07:53

## 2024-04-09 RX ADMIN — SODIUM CHLORIDE, SODIUM LACTATE, POTASSIUM CHLORIDE, CALCIUM CHLORIDE AND DEXTROSE MONOHYDRATE 1000 ML: 5; 600; 310; 30; 20 INJECTION, SOLUTION INTRAVENOUS at 02:02

## 2024-04-09 RX ADMIN — POTASSIUM BICARBONATE 40 MEQ: 782 TABLET, EFFERVESCENT ORAL at 20:28

## 2024-04-09 RX ADMIN — POLYVINYL ALCOHOL 1 DROP: 14 SOLUTION/ DROPS OPHTHALMIC at 07:53

## 2024-04-09 RX ADMIN — DICYCLOMINE HYDROCHLORIDE 20 MG: 10 CAPSULE ORAL at 07:52

## 2024-04-09 RX ADMIN — POLYVINYL ALCOHOL 1 DROP: 14 SOLUTION/ DROPS OPHTHALMIC at 20:35

## 2024-04-09 RX ADMIN — NYSTATIN 500000 UNITS: 100000 SUSPENSION ORAL at 20:27

## 2024-04-09 RX ADMIN — DICYCLOMINE HYDROCHLORIDE 20 MG: 10 CAPSULE ORAL at 14:07

## 2024-04-09 RX ADMIN — LORAZEPAM 0.5 MG: 0.5 TABLET ORAL at 07:53

## 2024-04-09 RX ADMIN — CARBAMAZEPINE 200 MG: 200 TABLET ORAL at 20:34

## 2024-04-09 RX ADMIN — Medication 40 MG: at 20:33

## 2024-04-09 RX ADMIN — SODIUM CHLORIDE, PRESERVATIVE FREE 40 MG: 5 INJECTION INTRAVENOUS at 20:28

## 2024-04-09 RX ADMIN — MAGNESIUM SULFATE HEPTAHYDRATE 2000 MG: 40 INJECTION, SOLUTION INTRAVENOUS at 07:59

## 2024-04-09 RX ADMIN — SODIUM CHLORIDE, PRESERVATIVE FREE 40 MG: 5 INJECTION INTRAVENOUS at 07:52

## 2024-04-09 RX ADMIN — LORAZEPAM 0.5 MG: 0.5 TABLET ORAL at 20:27

## 2024-04-09 RX ADMIN — SODIUM CHLORIDE, PRESERVATIVE FREE 10 ML: 5 INJECTION INTRAVENOUS at 07:53

## 2024-04-09 RX ADMIN — CARBAMAZEPINE 200 MG: 200 TABLET ORAL at 14:07

## 2024-04-09 RX ADMIN — POLYVINYL ALCOHOL 1 DROP: 14 SOLUTION/ DROPS OPHTHALMIC at 14:08

## 2024-04-09 RX ADMIN — POTASSIUM BICARBONATE 40 MEQ: 782 TABLET, EFFERVESCENT ORAL at 07:52

## 2024-04-09 RX ADMIN — CARBAMAZEPINE 200 MG: 200 TABLET ORAL at 07:53

## 2024-04-09 RX ADMIN — NYSTATIN 500000 UNITS: 100000 SUSPENSION ORAL at 07:53

## 2024-04-09 RX ADMIN — ONDANSETRON 4 MG: 2 INJECTION INTRAMUSCULAR; INTRAVENOUS at 13:35

## 2024-04-09 RX ADMIN — WATER 1000 MG: 1 INJECTION INTRAMUSCULAR; INTRAVENOUS; SUBCUTANEOUS at 23:35

## 2024-04-09 RX ADMIN — POLYVINYL ALCOHOL 1 DROP: 14 SOLUTION/ DROPS OPHTHALMIC at 19:24

## 2024-04-09 RX ADMIN — DONEPEZIL HYDROCHLORIDE 22.5 MG: 10 TABLET ORAL at 07:52

## 2024-04-09 RX ADMIN — ONDANSETRON 4 MG: 2 INJECTION INTRAMUSCULAR; INTRAVENOUS at 19:20

## 2024-04-09 RX ADMIN — PROMETHAZINE HYDROCHLORIDE 25 MG: 25 INJECTION INTRAMUSCULAR; INTRAVENOUS at 23:45

## 2024-04-09 RX ADMIN — NYSTATIN 500000 UNITS: 100000 SUSPENSION ORAL at 14:06

## 2024-04-09 RX ADMIN — TEMAZEPAM 15 MG: 15 CAPSULE ORAL at 23:37

## 2024-04-09 RX ADMIN — SODIUM CHLORIDE, PRESERVATIVE FREE 10 ML: 5 INJECTION INTRAVENOUS at 20:34

## 2024-04-09 RX ADMIN — DICYCLOMINE HYDROCHLORIDE 20 MG: 10 CAPSULE ORAL at 20:27

## 2024-04-09 ASSESSMENT — ENCOUNTER SYMPTOMS
ABDOMINAL PAIN: 1
ABDOMINAL DISTENTION: 1
RESPIRATORY NEGATIVE: 1

## 2024-04-09 ASSESSMENT — PAIN DESCRIPTION - FREQUENCY: FREQUENCY: CONTINUOUS

## 2024-04-09 ASSESSMENT — PAIN - FUNCTIONAL ASSESSMENT: PAIN_FUNCTIONAL_ASSESSMENT: ACTIVITIES ARE NOT PREVENTED

## 2024-04-09 ASSESSMENT — PAIN DESCRIPTION - ONSET: ONSET: ON-GOING

## 2024-04-09 ASSESSMENT — PAIN DESCRIPTION - ORIENTATION: ORIENTATION: LEFT

## 2024-04-09 ASSESSMENT — PAIN DESCRIPTION - DESCRIPTORS: DESCRIPTORS: ACHING;DISCOMFORT

## 2024-04-09 ASSESSMENT — PAIN SCALES - GENERAL
PAINLEVEL_OUTOF10: 8
PAINLEVEL_OUTOF10: 3

## 2024-04-09 ASSESSMENT — PAIN DESCRIPTION - PAIN TYPE: TYPE: CHRONIC PAIN

## 2024-04-09 NOTE — PROGRESS NOTES
Pt nauseated, medicated with PRN zofran. Scheduled meds not given at this time. Electronically signed by Aar Levy RN on 4/9/2024 at 1:40 PM

## 2024-04-09 NOTE — PROGRESS NOTES
Pt has been sleeping for intervals. No reported nausea. Pt requested heat pack for abdominal discomfort. Pt has been tolerating apple juice overnight.  IVFs infusing. Pt turned and repositioned in bed. Ileostomy with good output.   Electronically signed by Elena Dickinson RN on 4/9/24 at 5:48 AM EDT

## 2024-04-09 NOTE — PROGRESS NOTES
Renal:    Recent Labs     04/07/24  0609 04/08/24  0934 04/09/24  0503   * 128* 129*   K 3.3* 3.8 3.4*   CL 96* 94* 93*   CO2 29 27 27   BUN <2* <2* <2*   CREATININE <0.5* <0.5* <0.5*   GLUCOSE 88 74 96   CALCIUM 8.3 8.0* 7.5*   MG 1.60* 1.60* 1.30*   PHOS  --  1.2* 2.9   ANIONGAP 8 7 9     Hepatic:   Recent Labs     04/07/24  0609 04/08/24  0934 04/09/24  0503   AST 22 21 25   ALT 20 18 17   BILITOT 0.3 0.3 <0.2   BILIDIR  --  <0.2 <0.2   PROT 5.7* 5.1* 4.8*   LABALBU 3.3* 3.1* 2.9*   ALKPHOS 113 110 103     Troponin: No results for input(s): \"TROPONINI\" in the last 72 hours.  BNP: No results for input(s): \"BNP\" in the last 72 hours.  Lipids: No results for input(s): \"CHOL\", \"HDL\" in the last 72 hours.    Invalid input(s): \"LDLCALCU\", \"TRIGLYCERIDE\"  ABGs:  No results for input(s): \"PHART\", \"ZWH6WTA\", \"PO2ART\", \"KVC7HCA\", \"BEART\", \"THGBART\", \"R0AKBYWJ\", \"ZSO4JOQ\" in the last 72 hours.    INR:   Recent Labs     04/08/24  0934   INR 1.10     Lactate: No results for input(s): \"LACTATE\" in the last 72 hours.  Cultures:  -----------------------------------------------------------------  RAD:   CT ABDOMEN PELVIS W IV CONTRAST Additional Contrast? None   Final Result      There is no evidence of bowel obstruction. Patient has had a total colectomy no evidence of a Hines's pouch. A rectal wall thickening is noted.      Multiple bladder stones are identified.      There is fluid identified in the vaginal vault of uncertain etiology.       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    Abdominal pain in a patient with multiple abdominal surgeries  Chronic abdominal pain  Patient came in with abdominal pain, and decreased oral intake.  CT abdomen pelvis showed no signs of bowel obstruction but did show multiple bladder stones.   General surgery consulted and recommended no intervention.  Patient was evaluated by gastroenterology and had an EGD on 4/18/2024, white plaques were found in the esophagus.  Biopsies were taken.  - Continue nystatin  - Continue Protonix 40 mg twice daily  - GI following    Klebsiella UTI   Urinalysis urine culture growing Klebsiella that is sensitive to ceftriaxone  -Day 4 of ceftriaxone    Chronic pain syndrome in a patient with traumatic childhood background  Patient has been evaluated by psychiatry during this admission.  Continue current psych meds Prozac 20 mg daily, Ativan 0.5 mg twice daily for anxiety and temazepam 15 mg daily.    Hyponatremia  Hypochloremia  May be due to decreased oral intake, in the setting of multiple electrolyte abnormalities.  Encourage p.o. intake  - Monitor daily labs   -order labs for hyponatremia if no improvement    Code Status: Full code  FEN: Adult diet  PPX: Lovenox  DISPO: JALEEL Juárez MD, PGY-2  04/09/24  12:56 PM    This patient has been staffed and discussed with Chaz Feliz*.

## 2024-04-09 NOTE — CONSULTS
[facility]  Palliative Medicine Consultation Note      Date Of Admission:4/4/2024  Date of consult: 04/09/24  Seen by PC in the past:  No    Recommendations:        Introduced palliative care and had a voluntary discussion about advance care planning. The pt reports her brother Dillon and friend Sapna are her HCPOAs. We called Sapna to see if she can bring the HCPOA into the hospital and she said she can bring it in tomorrow. Discussed code status and the pt says she would be open to being on a ventilator for a short time, but would not want to undergo CPR in the event of an arrest. Sapna is supportive of pt's wishes. The pt says her quality of life is good at her baseline. She has lived at [facility] for 2 years and enjoys going around in her electric wheelchair, talking with other residents in the common areas, and attends [employer] at the facility daily. She is frustrated by her bloating and abdominal pain and hopes to feel better soon.    1. Goals of Care/Advanced Care planning/Code status: changed to DNR-CCA per pt's wishes. She hopes to feel relief from her abdominal bloating and pain and then return to [facility].  2. Pain: c/o 8/10 abdominal pain related to bloating. She reports she usually lets air out of her ostomy bag several times per day and has not had to do that recently. She says it feels like she has to pass gas and can't. Changed simethicone to 4 times per day. Pt reports she takes simethicone 4 times per day at home. She has had no dietary changes and reports she has only drank Ensure and apple juice (and some hard candy) for years due to numerous dietary intolerances/allergies. She has oxycodone prn which she has not received in the past 2 days.  3. SOB: denies sob and is breathing comfortably on room air.  4. Disposition: d/c to [facility] once her abdominal bloating/pain is resolved    Reason for Consult:         [x]  Goals of Care  []  Code Status

## 2024-04-09 NOTE — PROGRESS NOTES
4 Eyes Skin Assessment     NAME:  Liana Salmeron  YOB: 1955  MEDICAL RECORD NUMBER:  [id number]    The patient is being assessed for  Admission    I agree that at least one RN has performed a thorough Head to Toe Skin Assessment on the patient. ALL assessment sites listed below have been assessed.      Areas assessed by both nurses:    Head, Face, Ears, Shoulders, Back, Chest, Arms, Elbows, Hands, Sacrum. Buttock, Coccyx, Ischium, Legs. Feet and Heels, and Under Medical Devices         Does the Patient have a Wound? No noted wound(s)       Sidney Prevention initiated by RN: Yes  Wound Care Orders initiated by RN: Yes    Pressure Injury (Stage 3,4, Unstageable, DTI, NWPT, and Complex wounds) if present, place Wound referral order by RN under : No    New Ostomies, if present place, Ostomy referral order under : No Old Ostomy    Nurse 1 eSignature: Electronically signed by Oliverio Clements RN on 4/9/24 at 7:16 PM EDT    **SHARE this note so that the co-signing nurse can place an eSignature**    Nurse 2 eSignature: Electronically signed by Joaquina Hector RN on 4/9/24 at 10:56 PM EDT

## 2024-04-09 NOTE — CARE COORDINATION
Patient is from [facility] and is a paid bed and can return when medically stable. She will need transport arranged.     Patient had EGD yesterday and biopsies were taken. Today she complained of some nausea and continues to have poor intake. Dietician consulted.     Electronically signed by Corazon Gorman RN on 4/9/2024 at 2:32 PM  [phone]

## 2024-04-09 NOTE — PROGRESS NOTES
SBAR report called to IRAIS Whipple on 3S. Electronically signed by Ara Levy RN on 4/9/2024 at 12:34 PM

## 2024-04-09 NOTE — PROGRESS NOTES
General Surgery   Daily Progress Note  Patient: Liana Salmeron      CC: abdominal pain, nausea    SUBJECTIVE:   No acute events. Tolerating clear liquid diet without nausea or vomiting. Patient is having epigastric abdominal pain. No other issues at this time.    ROS:   A 14 point review of systems was conducted, significant findings as noted above. All other systems negative.    OBJECTIVE:    PHYSICAL EXAM:    Vitals:    04/08/24 1537 04/08/24 2041 04/08/24 2308 04/09/24 0324   BP: 116/60 (!) 135/94 138/66 110/67   Pulse: 65 74 72 71   Resp: 16 16 16 16   Temp: 98.3 °F (36.8 °C) 98.1 °F (36.7 °C) 98.1 °F (36.7 °C) 98.1 °F (36.7 °C)   TempSrc: Oral Oral Oral Oral   SpO2: 97% 98% 97% 97%   Weight:       Height:         General appearance: Alert, no acute distress  Eyes: No scleral icterus, EOM grossly intact  Neck: Trachea midline, no JVD  Chest/Lungs:  normal effort with no accessory muscle use on RA  Cardiovascular: RRR, well perfused  Abdomen:  soft, presence of ileostomy with gas and brown stool in bag, non tender around parastomal area, non distended   Skin: Warm and dry, no rashes  Extremities: No edema, no cyanosis  Neuro: A&Ox3, no focal deficits, sensation intact    LABS:   Recent Labs     04/08/24  0934 04/09/24  0503   WBC 5.3 4.1   HGB 13.5 12.4   HCT 39.3 34.8*   MCV 96.9 94.9   * 123*          Recent Labs     04/08/24  0934 04/09/24  0503   * 129*   K 3.8 3.4*   CL 94* 93*   CO2 27 27   PHOS 1.2* 2.9   BUN <2* <2*   CREATININE <0.5* <0.5*          Recent Labs     04/08/24  0934 04/09/24  0503   AST 21 25   ALT 18 17   BILIDIR <0.2 <0.2   BILITOT 0.3 <0.2   ALKPHOS 110 103          No results for input(s): \"LIPASE\", \"AMYLASE\" in the last 72 hours.       Recent Labs     04/08/24  0934 04/09/24  0503   PROT 5.1* 4.8*   INR 1.10  --         No results for input(s): \"CKTOTAL\", \"CKMB\", \"CKMBINDEX\", \"TROPONINI\" in the last 72 hours.      ASSESSMENT & PLAN:   This is a 68 y.o. female complex

## 2024-04-10 PROBLEM — R11.2 NAUSEA AND VOMITING: Status: ACTIVE | Noted: 2024-04-10

## 2024-04-10 LAB
ALBUMIN SERPL-MCNC: 3 G/DL (ref 3.4–5)
ANION GAP SERPL CALCULATED.3IONS-SCNC: 7 MMOL/L (ref 3–16)
BASOPHILS # BLD: 0 K/UL (ref 0–0.2)
BASOPHILS NFR BLD: 0.6 %
BUN SERPL-MCNC: <2 MG/DL (ref 7–20)
CALCIUM SERPL-MCNC: 7.8 MG/DL (ref 8.3–10.6)
CHLORIDE SERPL-SCNC: 91 MMOL/L (ref 99–110)
CO2 SERPL-SCNC: 29 MMOL/L (ref 21–32)
CREAT SERPL-MCNC: <0.5 MG/DL (ref 0.6–1.2)
DEPRECATED RDW RBC AUTO: 12.8 % (ref 12.4–15.4)
EOSINOPHIL # BLD: 0.3 K/UL (ref 0–0.6)
EOSINOPHIL NFR BLD: 7 %
GFR SERPLBLD CREATININE-BSD FMLA CKD-EPI: >90 ML/MIN/{1.73_M2}
GLUCOSE BLD-MCNC: 111 MG/DL (ref 70–99)
GLUCOSE BLD-MCNC: 156 MG/DL (ref 70–99)
GLUCOSE BLD-MCNC: 98 MG/DL (ref 70–99)
GLUCOSE SERPL-MCNC: 93 MG/DL (ref 70–99)
HCT VFR BLD AUTO: 37.2 % (ref 36–48)
HGB BLD-MCNC: 13.1 G/DL (ref 12–16)
LYMPHOCYTES # BLD: 1 K/UL (ref 1–5.1)
LYMPHOCYTES NFR BLD: 21.2 %
MAGNESIUM SERPL-MCNC: 1.5 MG/DL (ref 1.8–2.4)
MCH RBC QN AUTO: 33.4 PG (ref 26–34)
MCHC RBC AUTO-ENTMCNC: 35.3 G/DL (ref 31–36)
MCV RBC AUTO: 94.5 FL (ref 80–100)
MONOCYTES # BLD: 0.7 K/UL (ref 0–1.3)
MONOCYTES NFR BLD: 15.9 %
NEUTROPHILS # BLD: 2.6 K/UL (ref 1.7–7.7)
NEUTROPHILS NFR BLD: 55.3 %
PERFORMED ON: ABNORMAL
PERFORMED ON: ABNORMAL
PERFORMED ON: NORMAL
PHOSPHATE SERPL-MCNC: 2.3 MG/DL (ref 2.5–4.9)
PLATELET # BLD AUTO: 129 K/UL (ref 135–450)
PMV BLD AUTO: 9.5 FL (ref 5–10.5)
POTASSIUM SERPL-SCNC: 3.9 MMOL/L (ref 3.5–5.1)
RBC # BLD AUTO: 3.93 M/UL (ref 4–5.2)
SODIUM SERPL-SCNC: 127 MMOL/L (ref 136–145)
WBC # BLD AUTO: 4.7 K/UL (ref 4–11)

## 2024-04-10 PROCEDURE — 6360000002 HC RX W HCPCS: Performed by: INTERNAL MEDICINE

## 2024-04-10 PROCEDURE — 2580000003 HC RX 258: Performed by: STUDENT IN AN ORGANIZED HEALTH CARE EDUCATION/TRAINING PROGRAM

## 2024-04-10 PROCEDURE — 6370000000 HC RX 637 (ALT 250 FOR IP)

## 2024-04-10 PROCEDURE — 6370000000 HC RX 637 (ALT 250 FOR IP): Performed by: NURSE PRACTITIONER

## 2024-04-10 PROCEDURE — 6370000000 HC RX 637 (ALT 250 FOR IP): Performed by: INTERNAL MEDICINE

## 2024-04-10 PROCEDURE — 1200000000 HC SEMI PRIVATE

## 2024-04-10 PROCEDURE — A4216 STERILE WATER/SALINE, 10 ML: HCPCS | Performed by: INTERNAL MEDICINE

## 2024-04-10 PROCEDURE — 6360000002 HC RX W HCPCS: Performed by: SURGERY

## 2024-04-10 PROCEDURE — 6370000000 HC RX 637 (ALT 250 FOR IP): Performed by: SURGERY

## 2024-04-10 PROCEDURE — 2580000003 HC RX 258: Performed by: INTERNAL MEDICINE

## 2024-04-10 PROCEDURE — 2580000003 HC RX 258

## 2024-04-10 PROCEDURE — 99231 SBSQ HOSP IP/OBS SF/LOW 25: CPT | Performed by: SURGERY

## 2024-04-10 PROCEDURE — 85025 COMPLETE CBC W/AUTO DIFF WBC: CPT

## 2024-04-10 PROCEDURE — 6360000002 HC RX W HCPCS: Performed by: STUDENT IN AN ORGANIZED HEALTH CARE EDUCATION/TRAINING PROGRAM

## 2024-04-10 PROCEDURE — 6360000002 HC RX W HCPCS

## 2024-04-10 PROCEDURE — 80069 RENAL FUNCTION PANEL: CPT

## 2024-04-10 PROCEDURE — 2500000003 HC RX 250 WO HCPCS

## 2024-04-10 PROCEDURE — 83735 ASSAY OF MAGNESIUM: CPT

## 2024-04-10 PROCEDURE — C9113 INJ PANTOPRAZOLE SODIUM, VIA: HCPCS | Performed by: INTERNAL MEDICINE

## 2024-04-10 RX ORDER — DEXTROSE AND SODIUM CHLORIDE 5; .9 G/100ML; G/100ML
INJECTION, SOLUTION INTRAVENOUS CONTINUOUS
Status: DISCONTINUED | OUTPATIENT
Start: 2024-04-10 | End: 2024-04-12

## 2024-04-10 RX ORDER — DRONABINOL 5 MG/1
5 CAPSULE ORAL 2 TIMES DAILY
Status: DISCONTINUED | OUTPATIENT
Start: 2024-04-10 | End: 2024-04-13 | Stop reason: HOSPADM

## 2024-04-10 RX ORDER — METOCLOPRAMIDE HYDROCHLORIDE 5 MG/ML
5 INJECTION INTRAMUSCULAR; INTRAVENOUS EVERY 6 HOURS
Status: DISCONTINUED | OUTPATIENT
Start: 2024-04-10 | End: 2024-04-13 | Stop reason: HOSPADM

## 2024-04-10 RX ORDER — DRONABINOL 2.5 MG/1
2.5 CAPSULE ORAL 2 TIMES DAILY
Status: DISCONTINUED | OUTPATIENT
Start: 2024-04-10 | End: 2024-04-10

## 2024-04-10 RX ORDER — MAGNESIUM SULFATE IN WATER 40 MG/ML
4000 INJECTION, SOLUTION INTRAVENOUS ONCE
Status: COMPLETED | OUTPATIENT
Start: 2024-04-10 | End: 2024-04-10

## 2024-04-10 RX ADMIN — POLYVINYL ALCOHOL 1 DROP: 14 SOLUTION/ DROPS OPHTHALMIC at 17:46

## 2024-04-10 RX ADMIN — ENOXAPARIN SODIUM 40 MG: 100 INJECTION SUBCUTANEOUS at 09:14

## 2024-04-10 RX ADMIN — SODIUM CHLORIDE, PRESERVATIVE FREE 40 MG: 5 INJECTION INTRAVENOUS at 20:30

## 2024-04-10 RX ADMIN — DEXTROSE AND SODIUM CHLORIDE: 5; 900 INJECTION, SOLUTION INTRAVENOUS at 18:50

## 2024-04-10 RX ADMIN — NYSTATIN 500000 UNITS: 100000 SUSPENSION ORAL at 20:30

## 2024-04-10 RX ADMIN — CARBAMAZEPINE 200 MG: 200 TABLET ORAL at 17:46

## 2024-04-10 RX ADMIN — MAGNESIUM SULFATE HEPTAHYDRATE 4000 MG: 40 INJECTION, SOLUTION INTRAVENOUS at 08:41

## 2024-04-10 RX ADMIN — Medication 40 MG: at 20:32

## 2024-04-10 RX ADMIN — DRONABINOL 2.5 MG: 2.5 CAPSULE ORAL at 09:57

## 2024-04-10 RX ADMIN — NYSTATIN 500000 UNITS: 100000 SUSPENSION ORAL at 09:10

## 2024-04-10 RX ADMIN — TEMAZEPAM 15 MG: 15 CAPSULE ORAL at 22:55

## 2024-04-10 RX ADMIN — NYSTATIN 500000 UNITS: 100000 SUSPENSION ORAL at 13:53

## 2024-04-10 RX ADMIN — NYSTATIN 500000 UNITS: 100000 SUSPENSION ORAL at 17:45

## 2024-04-10 RX ADMIN — FLUOXETINE HYDROCHLORIDE 20 MG: 20 CAPSULE ORAL at 09:11

## 2024-04-10 RX ADMIN — ONDANSETRON 4 MG: 2 INJECTION INTRAMUSCULAR; INTRAVENOUS at 07:01

## 2024-04-10 RX ADMIN — POLYVINYL ALCOHOL 1 DROP: 14 SOLUTION/ DROPS OPHTHALMIC at 20:32

## 2024-04-10 RX ADMIN — METOCLOPRAMIDE HYDROCHLORIDE 5 MG: 5 INJECTION INTRAMUSCULAR; INTRAVENOUS at 11:59

## 2024-04-10 RX ADMIN — ALUMINUM HYDROXIDE, MAGNESIUM HYDROXIDE, AND SIMETHICONE: 1200; 120; 1200 SUSPENSION ORAL at 09:57

## 2024-04-10 RX ADMIN — SODIUM CHLORIDE, PRESERVATIVE FREE 40 MG: 5 INJECTION INTRAVENOUS at 09:17

## 2024-04-10 RX ADMIN — DONEPEZIL HYDROCHLORIDE 22.5 MG: 10 TABLET ORAL at 09:11

## 2024-04-10 RX ADMIN — CARBAMAZEPINE 200 MG: 200 TABLET ORAL at 09:11

## 2024-04-10 RX ADMIN — LORAZEPAM 0.5 MG: 0.5 TABLET ORAL at 20:30

## 2024-04-10 RX ADMIN — WATER 1000 MG: 1 INJECTION INTRAMUSCULAR; INTRAVENOUS; SUBCUTANEOUS at 23:44

## 2024-04-10 RX ADMIN — OXYCODONE 5 MG: 5 TABLET ORAL at 12:00

## 2024-04-10 RX ADMIN — POLYVINYL ALCOHOL 1 DROP: 14 SOLUTION/ DROPS OPHTHALMIC at 09:14

## 2024-04-10 RX ADMIN — SODIUM CHLORIDE, PRESERVATIVE FREE 10 ML: 5 INJECTION INTRAVENOUS at 13:58

## 2024-04-10 RX ADMIN — METOCLOPRAMIDE HYDROCHLORIDE 5 MG: 5 INJECTION INTRAMUSCULAR; INTRAVENOUS at 18:43

## 2024-04-10 RX ADMIN — OXYCODONE 5 MG: 5 TABLET ORAL at 20:30

## 2024-04-10 RX ADMIN — CARBAMAZEPINE 200 MG: 200 TABLET ORAL at 13:53

## 2024-04-10 RX ADMIN — DICYCLOMINE HYDROCHLORIDE 20 MG: 10 CAPSULE ORAL at 20:30

## 2024-04-10 RX ADMIN — DRONABINOL 5 MG: 5 CAPSULE ORAL at 21:17

## 2024-04-10 RX ADMIN — Medication 40 MG: at 17:47

## 2024-04-10 RX ADMIN — SODIUM CHLORIDE, PRESERVATIVE FREE 10 ML: 5 INJECTION INTRAVENOUS at 20:32

## 2024-04-10 RX ADMIN — POTASSIUM PHOSPHATE, MONOBASIC AND POTASSIUM PHOSPHATE, DIBASIC 20 MMOL: 224; 236 INJECTION, SOLUTION, CONCENTRATE INTRAVENOUS at 14:04

## 2024-04-10 RX ADMIN — DICYCLOMINE HYDROCHLORIDE 20 MG: 10 CAPSULE ORAL at 13:55

## 2024-04-10 RX ADMIN — Medication 40 MG: at 14:26

## 2024-04-10 RX ADMIN — POLYVINYL ALCOHOL 1 DROP: 14 SOLUTION/ DROPS OPHTHALMIC at 13:55

## 2024-04-10 RX ADMIN — METOCLOPRAMIDE HYDROCHLORIDE 5 MG: 5 INJECTION INTRAMUSCULAR; INTRAVENOUS at 23:44

## 2024-04-10 RX ADMIN — CARBAMAZEPINE 200 MG: 200 TABLET ORAL at 20:31

## 2024-04-10 RX ADMIN — DICYCLOMINE HYDROCHLORIDE 20 MG: 10 CAPSULE ORAL at 09:11

## 2024-04-10 ASSESSMENT — PAIN DESCRIPTION - ONSET
ONSET: ON-GOING
ONSET: ON-GOING

## 2024-04-10 ASSESSMENT — PAIN DESCRIPTION - PAIN TYPE
TYPE: CHRONIC PAIN
TYPE: CHRONIC PAIN

## 2024-04-10 ASSESSMENT — PAIN DESCRIPTION - DESCRIPTORS
DESCRIPTORS: ACHING;DISCOMFORT
DESCRIPTORS: ACHING;DISCOMFORT

## 2024-04-10 ASSESSMENT — PAIN SCALES - GENERAL
PAINLEVEL_OUTOF10: 8
PAINLEVEL_OUTOF10: 7
PAINLEVEL_OUTOF10: 7

## 2024-04-10 ASSESSMENT — PAIN - FUNCTIONAL ASSESSMENT: PAIN_FUNCTIONAL_ASSESSMENT: ACTIVITIES ARE NOT PREVENTED

## 2024-04-10 ASSESSMENT — PAIN DESCRIPTION - LOCATION
LOCATION: ABDOMEN
LOCATION: ABDOMEN

## 2024-04-10 ASSESSMENT — PAIN DESCRIPTION - FREQUENCY
FREQUENCY: CONTINUOUS
FREQUENCY: CONTINUOUS

## 2024-04-10 ASSESSMENT — PAIN DESCRIPTION - ORIENTATION
ORIENTATION: LEFT
ORIENTATION: LEFT

## 2024-04-10 NOTE — PLAN OF CARE
Problem: Pain  Goal: Verbalizes/displays adequate comfort level or baseline comfort level  4/10/2024 1610 by Oliverio Clements, RN  Outcome: Progressing  Verbalizes/displays adequate comfort level or baseline comfort level:   Encourage patient to monitor pain and request assistance   Assess pain using appropriate pain scale   Administer analgesics based on type and severity of pain and evaluate response   Implement non-pharmacological measures as appropriate and evaluate response        Problem: Safety - Adult  Goal: Free from fall injury  4/10/2024 1610 by Oliverio Clements, RN  Outcome: Progressing  Free From Fall Injury: Instruct family/caregiver on patient safety      Problem: Skin/Tissue Integrity  Goal: Absence of new skin breakdown  Description: 1.  Monitor for areas of redness and/or skin breakdown  2.  Assess vascular access sites hourly  3.  Every 4-6 hours minimum:  Change oxygen saturation probe site  4.  Every 4-6 hours:  If on nasal continuous positive airway pressure, respiratory therapy assess nares and determine need for appliance change or resting period.  4/10/2024 1610 by Oliverio Clements, RN  Outcome: Progressing    Problem: Nutrition Deficit:  Goal: Optimize nutritional status  4/10/2024 1610 by Oliverio Clements, RN  Outcome: Progressing  Nutrient intake appropriate for improving, restoring, or maintaining nutritional needs:   Assess nutritional status and recommend course of action   Monitor oral intake, labs, and treatment plans   Recommend appropriate diets, oral nutritional supplements, and vitamin/mineral supplements

## 2024-04-10 NOTE — PROGRESS NOTES
Progress Note    Admit Date: 4/4/2024    Diet: ADULT DIET; Clear Liquid  ADULT ORAL NUTRITION SUPPLEMENT; Breakfast, Lunch, Dinner; Clear Liquid Oral Supplement    CC: Abdominal pain    Interval history:   Patient seen and examined at bedside, laying comfortably in bed.  She has been complaining of abdominal pain with nausea but no vomiting.  She had a heating pad on the abdomen.  Palpation showed mild tenderness to the right quadrant.  No other acute complaints at this time.  Vital stable, no fevers.  Hyponatremia not improving on the  labs, other electrolytes replaced    Medications:     Scheduled Meds:   droNABinol  2.5 mg Oral BID    magnesium sulfate  4,000 mg IntraVENous Once    potassium phosphate IVPB (PERIPHERAL LINE)  20 mmol IntraVENous Once    simethicone  40 mg Oral 4x Daily    FLUoxetine  20 mg Oral Daily    temazepam  15 mg Oral Nightly    nystatin  5 mL Oral 4x Daily    lidocaine  1 patch TransDERmal Daily    pantoprazole (PROTONIX) 40 mg in sodium chloride (PF) 0.9 % 10 mL injection  40 mg IntraVENous Q12H    dicyclomine  20 mg Oral TID    sodium chloride flush  5-40 mL IntraVENous 2 times per day    enoxaparin  40 mg SubCUTAneous Daily    carBAMazepine  200 mg Oral 4x Daily    donepezil  22.5 mg Oral Daily    LORazepam  0.5 mg Oral BID    polyvinyl alcohol  1 drop Ophthalmic 4x daily    cefTRIAXone (ROCEPHIN) IV  1,000 mg IntraVENous Q24H     Continuous Infusions:   sodium chloride      dextrose 5% in lactated ringers 1,000 mL (04/09/24 0202)     PRN Meds:haloperidol lactate, sodium chloride flush, sodium chloride, polyethylene glycol, acetaminophen **OR** acetaminophen, [DISCONTINUED] promethazine **OR** ondansetron, promethazine, oxyCODONE    Objective:   Vitals:   T-max:  Patient Vitals for the past 8 hrs:   BP Temp Temp src Pulse Resp SpO2 Weight   04/10/24 0641 -- -- -- -- -- -- 57 kg (125 lb 10.6 oz)   04/10/24 0356 126/60 97.8 °F (36.6 °C) Oral 70 18 95 % --       Intake/Output Summary (Last

## 2024-04-10 NOTE — PLAN OF CARE
Problem: Discharge Planning  Goal: Discharge to home or other facility with appropriate resources  Outcome: Progressing  Flowsheets (Taken 4/10/2024 0304)  Discharge to home or other facility with appropriate resources:   Identify barriers to discharge with patient and caregiver   Identify discharge learning needs (meds, wound care, etc)     Problem: Pain  Goal: Verbalizes/displays adequate comfort level or baseline comfort level  Outcome: Progressing  Flowsheets (Taken 4/10/2024 0304)  Verbalizes/displays adequate comfort level or baseline comfort level:   Encourage patient to monitor pain and request assistance   Assess pain using appropriate pain scale   Administer analgesics based on type and severity of pain and evaluate response   Implement non-pharmacological measures as appropriate and evaluate response     Problem: Safety - Adult  Goal: Free from fall injury  Outcome: Progressing  Flowsheets (Taken 4/10/2024 0304)  Free From Fall Injury: Instruct family/caregiver on patient safety     Problem: ABCDS Injury Assessment  Goal: Absence of physical injury  Outcome: Progressing  Flowsheets (Taken 4/10/2024 0304)  Absence of Physical Injury: Implement safety measures based on patient assessment     Problem: Skin/Tissue Integrity  Goal: Absence of new skin breakdown  Description: 1.  Monitor for areas of redness and/or skin breakdown  2.  Assess vascular access sites hourly  3.  Every 4-6 hours minimum:  Change oxygen saturation probe site  4.  Every 4-6 hours:  If on nasal continuous positive airway pressure, respiratory therapy assess nares and determine need for appliance change or resting period.  Outcome: Progressing     Problem: Nutrition Deficit:  Goal: Optimize nutritional status  Outcome: Progressing  Flowsheets (Taken 4/10/2024 0304)  Nutrient intake appropriate for improving, restoring, or maintaining nutritional needs:   Assess nutritional status and recommend course of action   Monitor oral intake,

## 2024-04-10 NOTE — PROGRESS NOTES
General Surgery   Daily Progress Note  Patient: Liana Salmeron      CC: abdominal pain, nausea    SUBJECTIVE:   No acute events but still complaining of epigastric pain. Still tolerating clear liquid diet without nausea or vomiting.    ROS:   A 14 point review of systems was conducted, significant findings as noted above. All other systems negative.    OBJECTIVE:    PHYSICAL EXAM:    Vitals:    04/09/24 1401 04/09/24 2023 04/10/24 0356 04/10/24 0641   BP: 121/62 124/64 126/60    Pulse: 76 66 70    Resp: 14 18 18    Temp: 97.9 °F (36.6 °C) 98.3 °F (36.8 °C) 97.8 °F (36.6 °C)    TempSrc: Oral Oral Oral    SpO2: 99% 98% 95%    Weight:    57 kg (125 lb 10.6 oz)   Height:         General appearance: Alert, no acute distress  Eyes: No scleral icterus, EOM grossly intact  Neck: Trachea midline, no JVD  Chest/Lungs:  normal effort with no accessory muscle use on RA  Cardiovascular: RRR, well perfused  Abdomen:  soft, presence of ileostomy with gas and brown stool in bag, non tender around parastomal area, non distended   Skin: Warm and dry, no rashes  Extremities: No edema, no cyanosis  Neuro: A&Ox3, no focal deficits, sensation intact    LABS:   Recent Labs     04/09/24  0503 04/10/24  0354   WBC 4.1 4.7   HGB 12.4 13.1   HCT 34.8* 37.2   MCV 94.9 94.5   * 129*          Recent Labs     04/09/24  0503 04/10/24  0354   * 127*   K 3.4* 3.9   CL 93* 91*   CO2 27 29   PHOS 2.9 2.3*   BUN <2* <2*   CREATININE <0.5* <0.5*          Recent Labs     04/08/24  0934 04/09/24  0503   AST 21 25   ALT 18 17   BILIDIR <0.2 <0.2   BILITOT 0.3 <0.2   ALKPHOS 110 103          No results for input(s): \"LIPASE\", \"AMYLASE\" in the last 72 hours.       Recent Labs     04/08/24  0934 04/09/24  0503   PROT 5.1* 4.8*   INR 1.10  --         No results for input(s): \"CKTOTAL\", \"CKMB\", \"CKMBINDEX\", \"TROPONINI\" in the last 72 hours.      ASSESSMENT & PLAN:   This is a 68 y.o. female complex hx including GERD, chronic constipation w/

## 2024-04-10 NOTE — CARE COORDINATION
Case Management           Date/ Time of Note: 4/10/2024 10:58 AM  Note completed by: RITU Castaneda, LILO    If patient is discharged prior to next notation, then this note serves as note for discharge by case management.    Patient Name: Liana Salmeron  YOB: 1955    Diagnosis:Hypokalemia [E87.6]  Hypomagnesemia [E83.42]  UTI (urinary tract infection) [N39.0]  Abdominal pain, unspecified abdominal location [R10.9]  Patient Admission Status: Inpatient  Date of Admission:4/4/2024  7:33 PM    Length of Stay: 5 GLOS: GMLOS: 2.6 Readmission Risk Score: Readmission Risk Score: 13.3    Current Plan of Care:   Pt is from [facility] and is a paid bedhold. Pt is able to return at [facility]. Pt will need transport.     GI re-cons for cont pain and nausea despite Nystatin. GI cocktail today. Poor Po intake, biopsies are pending. Dietitian cons for nutrition plan.     Referrals completed: LTC: [facility]    Resources/ information provided: Not indicated at this time    IMM Status:   will need       Liana and her family were provided with choice of provider; she and her family are in agreement with the discharge plan.    Electronically signed by RITU Castaneda, CHENCHOW on 4/10/2024 at 10:58 AM  [facility]  Case Management Department  Ph: [phone]

## 2024-04-10 NOTE — ACP (ADVANCE CARE PLANNING)
Advance Care Planning      Palliative Medicine Provider (MD/NP)  Advance Care Planning (ACP) Conversation      Date of Conversation: 04/10/24    The patient and/or authorized decision maker consented to a voluntary Advance Care Planning conversation.   Individuals present for the conversation:  Patient with decision making capacity  Other persons present:  None    Legal Healthcare Decision Maker(s):    Primary Decision Maker: RUYD (DPOA)DUSTY - [place] - [phone]    Secondary Decision Maker: ZULLY (DPOA)JAKE - Brother/Sister - [phone]    ACP documents available in EMR prior to discussion:  [] Advance Medical Directive  [] Portable DNR  [] POLST  [] [facility] MOST   [] None  [] ACP documents have been previously completed by patient or surrogate, but are not available today for review.        Primary Palliative Diagnosis:   Multiple abdominal surgeries, ileostomy    Conversation Summary:   Pt's friend Dusty brought pt's POA to the hospital this morning which is a general durable power of  with a section (#10) regarding health care decision making, giving the POA the ability to consent to treatment. Dusty is listed as pt's POA and pt's brother Jake Ya is listed as the alternate agent.    Pt continues to c/o abdominal pain and bloating. She is now taking marinol, reglan, and prn oxycodone.    Resuscitation Status:   Code Status: DNR-CCA     Outcomes / Completed Documentation:  An explanation of advance directives and their importance was provided and the following forms completed:    [] Advance Medical Directive  [] Portable DNR   [] POLST  [x] No new documents completed  [] Patient or surrogate was asked to provide previously completed documents to the palliative team    If new document completed, original was provided to patient and/or family member.    Copy was placed for scanning into the [facility] EMR.      I spent 10 minutes providing separately identifiable ACP services with the patient

## 2024-04-10 NOTE — PROGRESS NOTES
Pt c/o abd discomfort throughout shift, heat packs and repositioning for comfort. PRN Phenergan and zofran administered for intermittent nausea. RR easy and unlabored. VSS. I&Os as documented. Ileostomy intact and draining without issues. Bed locked and in lowest position. Pt updated on care plan/status. Will continue to monitor.

## 2024-04-10 NOTE — PROGRESS NOTES
Progress Note    Patient Liana Salmeron  MRN: [id number]  YOB: 1955 Age: 68 y.o. Sex: female  Room: [address] [place] [address]       Admitting Physician: Mario Rodriguez MD   Date of Admission: 4/4/2024  7:33 PM   Primary Care Physician: Tristan Santiago MD     Subjective:  Liana Salmeron was seen and examined. We are reconsulted after seen the patient earlier in her hospitalization.  Briefly, 68-year-old female with chronic GERD and chronic constipation with total colectomy with ileorectal anastomosis and subsequent resection of anastomosis and creation of end ileostomy with further revisions he was admitted with abdominal pain and nausea.  CT scan unremarkable.  Upper endoscopy on 4/8/2024 unremarkable with the exception of white plaques in the esophagus positive for fungal organisms, which was treated with nystatin.  -- Patient planes of pain and her left upper to mid quadrant as well as nausea and poor appetite.    ROS:  Constitutional: Denies fever, no change in appetite  Respiratory: Denies cough or shortness of breath  Cardiovascular: Denies chest pain or edema    Objective:  Vital Signs:   Vitals:    04/10/24 0356   BP: 126/60   Pulse: 70   Resp: 18   Temp: 97.8 °F (36.6 °C)   SpO2: 95%         Physical Exam:  Constitutional: Alert and oriented x 4. No acute distress.   HEENT: Sclera anicteric, mucosal membranes moist  Cardiovascular: Regular rate and rhythm.  No murmurs.  Respiratory: Respirations nonlabored, no crepitus  GI: Abdomen nondistended, soft, and mildly tender in left abdomen.  Normal active bowel sounds.  No masses palpable.  Ostomy with stool in right upper quadrant.  Rectal: Deferred  Musculoskeletal:  No pitting edema of the lower legs.  Neurological: Gross memory appears intact.  no Asterixis    Intake/Output:    Intake/Output Summary (Last 24 hours) at 4/10/2024 1057  Last data filed at 4/10/2024 1047  Gross per 24 hour   Intake 1560 ml   Output 3650 ml   Net -2090  Oral Daily PRN Collin Vance MD        acetaminophen (TYLENOL) tablet 650 mg  650 mg Oral Q6H PRN Collin Vance MD   650 mg at 04/05/24 0109    Or    acetaminophen (TYLENOL) suppository 650 mg  650 mg Rectal Q6H PRN Collin Vance MD        ondansetron (ZOFRAN) injection 4 mg  4 mg IntraVENous Q6H PRN Collin Vance MD   4 mg at 04/10/24 0701    promethazine (PHENERGAN) injection 25 mg  25 mg IntraMUSCular Q4H PRN Collin Vance MD   25 mg at 04/09/24 2345    carBAMazepine (TEGRETOL) tablet 200 mg  200 mg Oral 4x Daily Collin Vance MD   200 mg at 04/10/24 0911    donepezil (ARICEPT) tablet 22.5 mg  22.5 mg Oral Daily Collin Vance MD   22.5 mg at 04/10/24 0911    LORazepam (ATIVAN) tablet 0.5 mg  0.5 mg Oral BID Collin Vance MD   0.5 mg at 04/09/24 2027    oxyCODONE (ROXICODONE) immediate release tablet 5 mg  5 mg Oral Q6H PRN Collin Vance MD   5 mg at 04/07/24 1829    dextrose 5 % in lactated ringers infusion  1,000 mL IntraVENous Continuous Collin Vance MD 75 mL/hr at 04/09/24 0202 1,000 mL at 04/09/24 0202    polyvinyl alcohol (LIQUIFILM TEARS) 1.4 % ophthalmic solution 1 drop  1 drop Ophthalmic 4x daily Collin Vance MD   1 drop at 04/10/24 0914    cefTRIAXone (ROCEPHIN) 1,000 mg in sterile water 10 mL IV syringe  1,000 mg IntraVENous Q24H Collin Vance MD   1,000 mg at 04/09/24 2335         Recent Imaging:   EGD  No dictation        Labs:   Recent Labs     04/08/24  0934 04/09/24  0503 04/10/24  0354   WBC 5.3 4.1 4.7   HGB 13.5 12.4 13.1   * 123* 129*   ALKPHOS 110 103  --    ALT 18 17  --    AST 21 25  --    BILITOT 0.3 <0.2  --    BILIDIR <0.2 <0.2  --    IBILI see below see below  --    BUN <2* <2* <2*   CREATININE <0.5* <0.5* <0.5*   * 129* 127*   K 3.8 3.4* 3.9   INR 1.10  --   --    PROT 5.1* 4.8*  --    LABALBU 3.1* 2.9* 3.0*          Assessment:  Hospital Problems             Last Modified POA    * (Principal) UTI

## 2024-04-11 LAB
ALBUMIN SERPL-MCNC: 2.8 G/DL (ref 3.4–5)
ANION GAP SERPL CALCULATED.3IONS-SCNC: 7 MMOL/L (ref 3–16)
BASOPHILS # BLD: 0 K/UL (ref 0–0.2)
BASOPHILS NFR BLD: 0.6 %
BUN SERPL-MCNC: <2 MG/DL (ref 7–20)
CALCIUM SERPL-MCNC: 7 MG/DL (ref 8.3–10.6)
CHLORIDE SERPL-SCNC: 91 MMOL/L (ref 99–110)
CO2 SERPL-SCNC: 26 MMOL/L (ref 21–32)
CREAT SERPL-MCNC: <0.5 MG/DL (ref 0.6–1.2)
DEPRECATED RDW RBC AUTO: 12.7 % (ref 12.4–15.4)
EOSINOPHIL # BLD: 0.3 K/UL (ref 0–0.6)
EOSINOPHIL NFR BLD: 7.3 %
GFR SERPLBLD CREATININE-BSD FMLA CKD-EPI: >90 ML/MIN/{1.73_M2}
GLUCOSE BLD-MCNC: 101 MG/DL (ref 70–99)
GLUCOSE BLD-MCNC: 113 MG/DL (ref 70–99)
GLUCOSE BLD-MCNC: 84 MG/DL (ref 70–99)
GLUCOSE SERPL-MCNC: 89 MG/DL (ref 70–99)
HCT VFR BLD AUTO: 35.6 % (ref 36–48)
HGB BLD-MCNC: 12.5 G/DL (ref 12–16)
LYMPHOCYTES # BLD: 0.7 K/UL (ref 1–5.1)
LYMPHOCYTES NFR BLD: 17.1 %
MAGNESIUM SERPL-MCNC: 1.9 MG/DL (ref 1.8–2.4)
MCH RBC QN AUTO: 33.3 PG (ref 26–34)
MCHC RBC AUTO-ENTMCNC: 35 G/DL (ref 31–36)
MCV RBC AUTO: 95.2 FL (ref 80–100)
MONOCYTES # BLD: 0.8 K/UL (ref 0–1.3)
MONOCYTES NFR BLD: 19.5 %
NEUTROPHILS # BLD: 2.4 K/UL (ref 1.7–7.7)
NEUTROPHILS NFR BLD: 55.5 %
OSMOLALITY SERPL: 260 MOSM/KG (ref 278–305)
OSMOLALITY UR: 326 MOSM/KG (ref 390–1070)
PERFORMED ON: ABNORMAL
PERFORMED ON: ABNORMAL
PERFORMED ON: NORMAL
PHOSPHATE SERPL-MCNC: 2.9 MG/DL (ref 2.5–4.9)
PLATELET # BLD AUTO: 127 K/UL (ref 135–450)
PMV BLD AUTO: 10 FL (ref 5–10.5)
POTASSIUM SERPL-SCNC: 3.7 MMOL/L (ref 3.5–5.1)
RBC # BLD AUTO: 3.74 M/UL (ref 4–5.2)
SODIUM SERPL-SCNC: 124 MMOL/L (ref 136–145)
SODIUM UR-SCNC: 77 MMOL/L
WBC # BLD AUTO: 4.3 K/UL (ref 4–11)

## 2024-04-11 PROCEDURE — 83930 ASSAY OF BLOOD OSMOLALITY: CPT

## 2024-04-11 PROCEDURE — 6360000002 HC RX W HCPCS: Performed by: STUDENT IN AN ORGANIZED HEALTH CARE EDUCATION/TRAINING PROGRAM

## 2024-04-11 PROCEDURE — 99231 SBSQ HOSP IP/OBS SF/LOW 25: CPT | Performed by: SURGERY

## 2024-04-11 PROCEDURE — 83735 ASSAY OF MAGNESIUM: CPT

## 2024-04-11 PROCEDURE — 84300 ASSAY OF URINE SODIUM: CPT

## 2024-04-11 PROCEDURE — 85025 COMPLETE CBC W/AUTO DIFF WBC: CPT

## 2024-04-11 PROCEDURE — 2500000003 HC RX 250 WO HCPCS: Performed by: SURGERY

## 2024-04-11 PROCEDURE — 83935 ASSAY OF URINE OSMOLALITY: CPT

## 2024-04-11 PROCEDURE — 6370000000 HC RX 637 (ALT 250 FOR IP): Performed by: INTERNAL MEDICINE

## 2024-04-11 PROCEDURE — 80069 RENAL FUNCTION PANEL: CPT

## 2024-04-11 PROCEDURE — 2580000003 HC RX 258: Performed by: INTERNAL MEDICINE

## 2024-04-11 PROCEDURE — 6360000002 HC RX W HCPCS: Performed by: SURGERY

## 2024-04-11 PROCEDURE — 2580000003 HC RX 258: Performed by: STUDENT IN AN ORGANIZED HEALTH CARE EDUCATION/TRAINING PROGRAM

## 2024-04-11 PROCEDURE — C9113 INJ PANTOPRAZOLE SODIUM, VIA: HCPCS | Performed by: INTERNAL MEDICINE

## 2024-04-11 PROCEDURE — A4216 STERILE WATER/SALINE, 10 ML: HCPCS | Performed by: INTERNAL MEDICINE

## 2024-04-11 PROCEDURE — 1200000000 HC SEMI PRIVATE

## 2024-04-11 PROCEDURE — 6360000002 HC RX W HCPCS: Performed by: INTERNAL MEDICINE

## 2024-04-11 RX ORDER — 0.9 % SODIUM CHLORIDE 0.9 %
500 INTRAVENOUS SOLUTION INTRAVENOUS ONCE
Status: COMPLETED | OUTPATIENT
Start: 2024-04-11 | End: 2024-04-11

## 2024-04-11 RX ADMIN — Medication 40 MG: at 19:49

## 2024-04-11 RX ADMIN — SODIUM CHLORIDE, PRESERVATIVE FREE 40 MG: 5 INJECTION INTRAVENOUS at 07:59

## 2024-04-11 RX ADMIN — TEMAZEPAM 15 MG: 15 CAPSULE ORAL at 23:29

## 2024-04-11 RX ADMIN — SODIUM CHLORIDE, PRESERVATIVE FREE 10 ML: 5 INJECTION INTRAVENOUS at 07:58

## 2024-04-11 RX ADMIN — ENOXAPARIN SODIUM 40 MG: 100 INJECTION SUBCUTANEOUS at 07:58

## 2024-04-11 RX ADMIN — CARBAMAZEPINE 200 MG: 200 TABLET ORAL at 08:15

## 2024-04-11 RX ADMIN — SODIUM CHLORIDE, PRESERVATIVE FREE 40 MG: 5 INJECTION INTRAVENOUS at 19:48

## 2024-04-11 RX ADMIN — DRONABINOL 5 MG: 5 CAPSULE ORAL at 21:35

## 2024-04-11 RX ADMIN — SODIUM CHLORIDE 500 ML: 9 INJECTION, SOLUTION INTRAVENOUS at 19:06

## 2024-04-11 RX ADMIN — DICYCLOMINE HYDROCHLORIDE 20 MG: 10 CAPSULE ORAL at 13:14

## 2024-04-11 RX ADMIN — LORAZEPAM 0.5 MG: 0.5 TABLET ORAL at 07:59

## 2024-04-11 RX ADMIN — FLUOXETINE HYDROCHLORIDE 20 MG: 20 CAPSULE ORAL at 07:59

## 2024-04-11 RX ADMIN — POLYVINYL ALCOHOL 1 DROP: 14 SOLUTION/ DROPS OPHTHALMIC at 16:01

## 2024-04-11 RX ADMIN — METOCLOPRAMIDE HYDROCHLORIDE 5 MG: 5 INJECTION INTRAMUSCULAR; INTRAVENOUS at 13:14

## 2024-04-11 RX ADMIN — DICYCLOMINE HYDROCHLORIDE 20 MG: 10 CAPSULE ORAL at 19:48

## 2024-04-11 RX ADMIN — METOCLOPRAMIDE HYDROCHLORIDE 5 MG: 5 INJECTION INTRAMUSCULAR; INTRAVENOUS at 18:09

## 2024-04-11 RX ADMIN — POLYVINYL ALCOHOL 1 DROP: 14 SOLUTION/ DROPS OPHTHALMIC at 08:06

## 2024-04-11 RX ADMIN — CARBAMAZEPINE 200 MG: 200 TABLET ORAL at 16:02

## 2024-04-11 RX ADMIN — ONDANSETRON 4 MG: 2 INJECTION INTRAMUSCULAR; INTRAVENOUS at 08:14

## 2024-04-11 RX ADMIN — Medication 40 MG: at 13:15

## 2024-04-11 RX ADMIN — POLYVINYL ALCOHOL 1 DROP: 14 SOLUTION/ DROPS OPHTHALMIC at 19:49

## 2024-04-11 RX ADMIN — NYSTATIN 500000 UNITS: 100000 SUSPENSION ORAL at 07:58

## 2024-04-11 RX ADMIN — SODIUM BICARBONATE 50 MEQ: 84 INJECTION INTRAVENOUS at 18:01

## 2024-04-11 RX ADMIN — METOCLOPRAMIDE HYDROCHLORIDE 5 MG: 5 INJECTION INTRAMUSCULAR; INTRAVENOUS at 23:32

## 2024-04-11 RX ADMIN — Medication 40 MG: at 16:01

## 2024-04-11 RX ADMIN — Medication 40 MG: at 08:06

## 2024-04-11 RX ADMIN — DRONABINOL 5 MG: 5 CAPSULE ORAL at 09:15

## 2024-04-11 RX ADMIN — DICYCLOMINE HYDROCHLORIDE 20 MG: 10 CAPSULE ORAL at 07:59

## 2024-04-11 RX ADMIN — METOCLOPRAMIDE HYDROCHLORIDE 5 MG: 5 INJECTION INTRAMUSCULAR; INTRAVENOUS at 06:18

## 2024-04-11 RX ADMIN — CARBAMAZEPINE 200 MG: 200 TABLET ORAL at 19:48

## 2024-04-11 RX ADMIN — POLYVINYL ALCOHOL 1 DROP: 14 SOLUTION/ DROPS OPHTHALMIC at 13:14

## 2024-04-11 RX ADMIN — NYSTATIN 500000 UNITS: 100000 SUSPENSION ORAL at 16:01

## 2024-04-11 RX ADMIN — DONEPEZIL HYDROCHLORIDE 22.5 MG: 10 TABLET ORAL at 08:04

## 2024-04-11 RX ADMIN — SODIUM CHLORIDE, PRESERVATIVE FREE 10 ML: 5 INJECTION INTRAVENOUS at 19:58

## 2024-04-11 RX ADMIN — OXYCODONE 5 MG: 5 TABLET ORAL at 07:59

## 2024-04-11 RX ADMIN — CARBAMAZEPINE 200 MG: 200 TABLET ORAL at 13:15

## 2024-04-11 RX ADMIN — NYSTATIN 500000 UNITS: 100000 SUSPENSION ORAL at 13:14

## 2024-04-11 RX ADMIN — NYSTATIN 500000 UNITS: 100000 SUSPENSION ORAL at 19:48

## 2024-04-11 ASSESSMENT — PAIN SCALES - GENERAL
PAINLEVEL_OUTOF10: 0
PAINLEVEL_OUTOF10: 4
PAINLEVEL_OUTOF10: 7

## 2024-04-11 ASSESSMENT — PAIN DESCRIPTION - DESCRIPTORS: DESCRIPTORS: ACHING;DISCOMFORT

## 2024-04-11 ASSESSMENT — PAIN DESCRIPTION - PAIN TYPE: TYPE: CHRONIC PAIN

## 2024-04-11 ASSESSMENT — PAIN DESCRIPTION - FREQUENCY: FREQUENCY: INTERMITTENT

## 2024-04-11 ASSESSMENT — PAIN DESCRIPTION - ONSET: ONSET: GRADUAL

## 2024-04-11 ASSESSMENT — PAIN DESCRIPTION - LOCATION: LOCATION: ABDOMEN

## 2024-04-11 ASSESSMENT — PAIN - FUNCTIONAL ASSESSMENT: PAIN_FUNCTIONAL_ASSESSMENT: ACTIVITIES ARE NOT PREVENTED

## 2024-04-11 ASSESSMENT — PAIN DESCRIPTION - ORIENTATION: ORIENTATION: LEFT

## 2024-04-11 NOTE — PLAN OF CARE
Problem: Nutrition Deficit:  Goal: Optimize nutritional status  4/11/2024 1046 by Romina Hinds RN  Outcome: Progressing   Monitor oral intake, labs, and treatment plans   Assess nutritional status and recommend course of action     Problem: Pain  Goal: Verbalizes/displays adequate comfort level or baseline comfort level  4/11/2024 1046 by Romina Hinds RN  Outcome: Progressing  Verbalizes/displays adequate comfort level or baseline comfort level:   Encourage patient to monitor pain and request assistance   Assess pain using appropriate pain scale   Administer analgesics based on type and severity of pain and evaluate response   Implement non-pharmacological measures as appropriate and evaluate response  Note: Patient complains of abdominal pain. The pain is described as aching, and is 7/10 in intensity. Pain is located in the LUQ without radiation. Symptoms have been gradually improving since. Associated symptoms: nausea.        Problem: Safety - Adult  Goal: Free from fall injury  4/11/2024 1046 by Romina Hinds RN    Problem: ABCDS Injury Assessment  Goal: Absence of physical injury  4/11/2024 1046 by Romina Hinsd RN  Outcome: Progressing    Problem: Skin/Tissue Integrity  Goal: Absence of new skin breakdown  Description: 1.  Monitor for areas of redness and/or skin breakdown  2.  Assess vascular access sites hourly  3.  Every 4-6 hours minimum:  Change oxygen saturation probe site  4.  Every 4-6 hours:  If on nasal continuous positive airway pressure, respiratory therapy assess nares and determine need for appliance change or resting period.  4/11/2024 1046 by Romina Hinds RN  Outcome: Progressing  Note: Skin assessed with no new areas of breakdown, frequent check, changes, and turns performed. Will continue to monitor.     Problem: Nutrition Deficit:  Goal: Optimize nutritional status  4/11/2024 1046 by Romina Hinds RN  Outcome: Not Progressing   Monitor oral intake, labs, and treatment plans

## 2024-04-11 NOTE — CARE COORDINATION
Patient not ready for discharge today per Hospitalist Attending.    4/10-- Completed 5 days IV antibiotics  Tolerating clear liquid diet    CM to follow up with discharge needs.

## 2024-04-11 NOTE — PLAN OF CARE
Problem: Discharge Planning  Goal: Discharge to home or other facility with appropriate resources  Outcome: Progressing     Problem: Pain  Goal: Verbalizes/displays adequate comfort level or baseline comfort level  4/10/2024 2245 by Joaquina Hector RN  Outcome: Progressing  Flowsheets (Taken 4/10/2024 2243)  Verbalizes/displays adequate comfort level or baseline comfort level:   Encourage patient to monitor pain and request assistance   Assess pain using appropriate pain scale   Administer analgesics based on type and severity of pain and evaluate response   Implement non-pharmacological measures as appropriate and evaluate response  Note: Patient complains of abdominal pain. The pain is described as aching, and is 7/10 in intensity. Pain is located in the LUQ without radiation. Symptoms have been gradually improving since. Associated symptoms: nausea.     Problem: Safety - Adult  Goal: Free from fall injury  4/10/2024 2245 by Joaquina Hector, RN  Outcome: Progressing     Problem: ABCDS Injury Assessment  Goal: Absence of physical injury  4/10/2024 2245 by Joaquina Hector RN  Outcome: Progressing     Problem: Skin/Tissue Integrity  Goal: Absence of new skin breakdown  Description: 1.  Monitor for areas of redness and/or skin breakdown  2.  Assess vascular access sites hourly  3.  Every 4-6 hours minimum:  Change oxygen saturation probe site  4.  Every 4-6 hours:  If on nasal continuous positive airway pressure, respiratory therapy assess nares and determine need for appliance change or resting period.  4/10/2024 2245 by Joaquina Hector, RN  Outcome: Progressing  Note: Pt at risk for skin breakdown.  Skin assessment complete.  No signs of skin breakdown.  Pts skin cleansed with inc cleanser and repositioned in bed with pillow support and freq turning as needed.  Will continue to monitor.       Problem: Nutrition Deficit:  Goal: Optimize nutritional status  4/10/2024 2245 by Joaquina Hector  RN  Outcome: Progressing  Flowsheets (Taken 4/10/2024 7350)  Nutrient intake appropriate for improving, restoring, or maintaining nutritional needs:   Monitor oral intake, labs, and treatment plans   Assess nutritional status and recommend course of action

## 2024-04-11 NOTE — PROGRESS NOTES
Comprehensive Nutrition Assessment    RECOMMENDATIONS:  PO Diet: Continue FLD (baseline)  ONS: Continue Ensure +HP TID (pt baseline- Boost Plus 5x daily)  Nutrition Education: Education not indicated     NUTRITION ASSESSMENT:   Nutritional summary & status: Follow up. Patient advanced to FLD this AM. Previously on CLD w/ poor intakes, unable to meet energy/protein needs. Ensure Enlive ordered TID w/ diet advancement. Noted on previous RD encounters, pt consumes FLD at baseline. Per EMR, marinol and reglan started in hopes to improvement tolerance. Patient denies abd pain/n/v.  EGD biopsies still pending. Spoke w/ pt, expresses strong appetite + requesting Ensure drink. Meal tray delivered  this AM while still on CLD. RD provided pt w/ 3 Ensure +HP and encouraged intake.     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, cervical stenosis, facial neuralgia, chronic pain, multiple UTIs    MALNUTRITION ASSESSMENT  Context of Malnutrition: Acute Illness   Malnutrition Status: At risk for malnutrition (Comment)  Findings of the 6 clinical characteristics of malnutrition (Minimum of 2 out of 6 clinical characteristics is required to make the diagnosis of moderate or severe Protein Calorie Malnutrition based on AND/ASPEN Guidelines):  Energy Intake:  50% or less of estimated energy requirements for 5 or more days  Weight Loss:   (no wt hx)     Body Fat Loss:  Unable to assess     Muscle Mass Loss:  Unable to assess    Fluid Accumulation:  No significant fluid accumulation     Strength:  Not Performed    NUTRITION DIAGNOSIS   Inadequate oral intake related to pain as evidenced by intake 0-25%, intake 26-50%, intake 51-75%    Nutrition Monitoring and Evaluation:

## 2024-04-11 NOTE — PROGRESS NOTES
abdominal pain.    Abdominal pain in a patient with multiple abdominal surgeries  Chronic abdominal pain, nausea  Esophageal candidiasis  Patient came in with abdominal pain, and decreased oral intake.  CT abdomen pelvis showed no signs of bowel obstruction but did show multiple bladder stones.   General surgery consulted and recommended no intervention.  Patient was evaluated by gastroenterology and had an EGD on 4/18/2024, white plaques were found in the esophagus.  Biopsies were taken.  - Continue nystatin  -Continue Marinol 2.5 mg twice daily  - Continue Reglan 5 mg every 6 hours  - Continue Protonix 40 mg twice daily  -Ordered GI cocktail with moderate improvement  - Patient not interested and PEG tube placement at the moment    Klebsiella UTI   Urinalysis urine culture growing Klebsiella that is sensitive to ceftriaxone  - S/p  5 days of ceftriaxone  -.  Currently denies any urinary symptoms    Chronic pain syndrome in a patient with traumatic childhood background  Patient has been evaluated by psychiatry during this admission.  Continue current psych meds Prozac 20 mg daily, Ativan 0.5 mg twice daily for anxiety and temazepam 15 mg daily.    Hyponatremia, Hypochloremia likely 2/2 reduced p.o.  Patient currently has limited p.o. intake, not able to tolerate solids.  She has lost about 15 LB since admission.  She was found to have white plaques in the esophagus during recent EGD, biopsies are still pending.  - Dietitian has been consulted to assist in developing adequate nutrition plan  -Encourage p.o. intake  - Monitor daily labs   order labs for hyponatremia if no improvement  Hyponatremia possibly secondary to poor oral intake?      Code Status: Full code, verified with the patient at bedside  POA is patient's brother Dillon  FEN: Adult diet  PPX: Lovenox  DISPO: Plan to return to long-term care    Prince Cecilia Juárez MD, PGY-2  04/11/24  3:21 PM    This patient has been staffed and discussed with Wilber

## 2024-04-11 NOTE — DISCHARGE INSTR - DIET

## 2024-04-11 NOTE — PROGRESS NOTES
Progress Note    Patient Liana Salmeron  MRN: [id number]  YOB: 1955 Age: 68 y.o. Sex: female  Room: [address] [place] [address]       Admitting Physician: Mario Rodriguez MD   Date of Admission: 4/4/2024  7:33 PM   Primary Care Physician: Tristan Santiago MD     Subjective:  Liana Salmeron was seen and examined. We are following for nausea and a poor appetite.  -- Patient reports improvement in nausea and oral intake.    ROS:  Constitutional: Denies fever, no change in appetite  Respiratory: Denies cough or shortness of breath  Cardiovascular: Denies chest pain or edema    Objective:  Vital Signs:   Vitals:    04/11/24 0829   BP:    Pulse:    Resp: 16   Temp:    SpO2:          Physical Exam:  Constitutional: Alert and oriented x 4. No acute distress.   HEENT: Sclera anicteric, mucosal membranes moist  Cardiovascular: Regular rate and rhythm.  No murmurs.  Respiratory: Respirations nonlabored, no crepitus  GI: Abdomen nondistended, soft, and mildly tender in left abdomen.  Normal active bowel sounds.  No masses palpable.  Ostomy with stool in right upper quadrant.  Rectal: Deferred  Musculoskeletal:  No pitting edema of the lower legs.  Neurological: Gross memory appears intact.  no Asterixis    Intake/Output:    Intake/Output Summary (Last 24 hours) at 4/11/2024 1705  Last data filed at 4/11/2024 0830  Gross per 24 hour   Intake 120 ml   Output 1275 ml   Net -1155 ml          Current Medications:  Current Facility-Administered Medications   Medication Dose Route Frequency Provider Last Rate Last Admin    sodium bicarbonate 8.4 % injection 50 mEq  50 mEq IntraVENous Once Cuauhtemoc Santana MD        metoclopramide (REGLAN) injection 5 mg  5 mg IntraVENous Q6H Valencia Corley MD   5 mg at 04/11/24 1314    droNABinol (MARINOL) capsule 5 mg  5 mg Oral BID Lula Guevara MD   5 mg at 04/11/24 0915    dextrose 5 % and 0.9 % sodium chloride infusion   IntraVENous Continuous Chaz Feliz  MD AVTAR 75 mL/hr at 04/10/24 1850 New Bag at 04/10/24 1850    simethicone (MYLICON) 40 MG/0.6ML suspension drops 40 mg  40 mg Oral 4x Daily Cuca Arrieta APRN - CNP   40 mg at 04/11/24 1601    haloperidol lactate (HALDOL) injection 1 mg  1 mg IntraMUSCular Q6H PRN Collin Vance MD        FLUoxetine (PROZAC) capsule 20 mg  20 mg Oral Daily Collin Vance MD   20 mg at 04/11/24 0759    temazepam (RESTORIL) capsule 15 mg  15 mg Oral Nightly Collin Vance MD   15 mg at 04/10/24 2255    nystatin (MYCOSTATIN) 180227 UNIT/ML suspension 500,000 Units  5 mL Oral 4x Daily Collin Vance MD   500,000 Units at 04/11/24 1601    lidocaine 4 % external patch 1 patch  1 patch TransDERmal Daily Collin Vance MD   1 patch at 04/11/24 0756    pantoprazole (PROTONIX) 40 mg in sodium chloride (PF) 0.9 % 10 mL injection  40 mg IntraVENous Q12H Collin Vance MD   40 mg at 04/11/24 0759    dicyclomine (BENTYL) capsule 20 mg  20 mg Oral TID Collin Vance MD   20 mg at 04/11/24 1314    sodium chloride flush 0.9 % injection 5-40 mL  5-40 mL IntraVENous 2 times per day Collin Vance MD   10 mL at 04/11/24 0758    sodium chloride flush 0.9 % injection 5-40 mL  5-40 mL IntraVENous PRN Collin Vance MD        0.9 % sodium chloride infusion   IntraVENous PRN Collin Vance MD        enoxaparin (LOVENOX) injection 40 mg  40 mg SubCUTAneous Daily Maikel Cisneros MD   40 mg at 04/11/24 0758    polyethylene glycol (GLYCOLAX) packet 17 g  17 g Oral Daily PRN Collin Vance MD        acetaminophen (TYLENOL) tablet 650 mg  650 mg Oral Q6H PRN Collin Vance MD   650 mg at 04/05/24 0109    Or    acetaminophen (TYLENOL) suppository 650 mg  650 mg Rectal Q6H PRN Collin Vance MD        ondansetron (ZOFRAN) injection 4 mg  4 mg IntraVENous Q6H PRN Collin Vance MD   4 mg at 04/11/24 0814    promethazine (PHENERGAN) injection 25 mg  25 mg IntraMUSCular Q4H PRN Collin Vance MD

## 2024-04-11 NOTE — PROGRESS NOTES
General Surgery   Daily Progress Note  Patient: Liana Salmeron      CC: abdominal pain, nausea    SUBJECTIVE:   No acute events. Tolerating CLD without nausea or vomiting. Pain is controlled. Slightly more improved with Reglan and Marinol.     ROS:   A 14 point review of systems was conducted, significant findings as noted above. All other systems negative.    OBJECTIVE:    PHYSICAL EXAM:    Vitals:    04/10/24 1153 04/10/24 2028 04/10/24 2100 04/11/24 0440   BP: (!) 101/55 (!) 103/55  (!) 102/59   Pulse: 79 69  68   Resp: 18 18 18 16   Temp: 97.6 °F (36.4 °C) 98.1 °F (36.7 °C)  98.1 °F (36.7 °C)   TempSrc: Oral Oral  Oral   SpO2: 97% 96%  96%   Weight:       Height:         General appearance: Alert, no acute distress  Eyes: No scleral icterus, EOM grossly intact  Neck: Trachea midline, no JVD  Chest/Lungs:  normal effort with no accessory muscle use on RA  Cardiovascular: RRR, well perfused  Abdomen:  soft, presence of ileostomy with gas and brown stool in bag, non tender around parastomal area, non distended   Skin: Warm and dry, no rashes  Extremities: No edema, no cyanosis  Neuro: A&Ox3, no focal deficits, sensation intact    LABS:   Recent Labs     04/10/24  0354 04/11/24  0429   WBC 4.7 4.3   HGB 13.1 12.5   HCT 37.2 35.6*   MCV 94.5 95.2   * 127*          Recent Labs     04/10/24  0354 04/11/24  0429   * 124*   K 3.9 3.7   CL 91* 91*   CO2 29 26   PHOS 2.3* 2.9   BUN <2* <2*   CREATININE <0.5* <0.5*          Recent Labs     04/08/24  0934 04/09/24  0503   AST 21 25   ALT 18 17   BILIDIR <0.2 <0.2   BILITOT 0.3 <0.2   ALKPHOS 110 103          No results for input(s): \"LIPASE\", \"AMYLASE\" in the last 72 hours.       Recent Labs     04/08/24  0934 04/09/24  0503   PROT 5.1* 4.8*   INR 1.10  --         No results for input(s): \"CKTOTAL\", \"CKMB\", \"CKMBINDEX\", \"TROPONINI\" in the last 72 hours.      ASSESSMENT & PLAN:   This is a 68 y.o. female complex hx including GERD, chronic constipation w/  prior total abdominal colectomy with ileorectal anastomosis c/b stricturing and subsequent resection of anastomosis and creation of end ileostomy w/ need for further revisions, most recently in 2012, who is currently admitted for abdominal pain.     - No acute surgical intervention   - GI recommending Reglan, will continue Marinol  - Continue empiric nystatin  - Diet per primary, okay for diet from surgical standpoint  - Antiemetics, pain control   - Remainder of care including disposition per primary     Thomas Harden DO  PGY-1, General Surgery Resident  04/11/24 7:41 AM  [phone]

## 2024-04-12 LAB
ALBUMIN SERPL-MCNC: 2.6 G/DL (ref 3.4–5)
ALBUMIN SERPL-MCNC: 2.8 G/DL (ref 3.4–5)
ANION GAP SERPL CALCULATED.3IONS-SCNC: 3 MMOL/L (ref 3–16)
ANION GAP SERPL CALCULATED.3IONS-SCNC: 4 MMOL/L (ref 3–16)
BASOPHILS # BLD: 0 K/UL (ref 0–0.2)
BASOPHILS NFR BLD: 0.5 %
BUN SERPL-MCNC: 4 MG/DL (ref 7–20)
BUN SERPL-MCNC: 5 MG/DL (ref 7–20)
CALCIUM SERPL-MCNC: 7.3 MG/DL (ref 8.3–10.6)
CALCIUM SERPL-MCNC: 7.7 MG/DL (ref 8.3–10.6)
CHLORIDE SERPL-SCNC: 102 MMOL/L (ref 99–110)
CHLORIDE SERPL-SCNC: 91 MMOL/L (ref 99–110)
CO2 SERPL-SCNC: 30 MMOL/L (ref 21–32)
CO2 SERPL-SCNC: 33 MMOL/L (ref 21–32)
CREAT SERPL-MCNC: <0.5 MG/DL (ref 0.6–1.2)
CREAT SERPL-MCNC: <0.5 MG/DL (ref 0.6–1.2)
DEPRECATED RDW RBC AUTO: 12.6 % (ref 12.4–15.4)
EOSINOPHIL # BLD: 0.2 K/UL (ref 0–0.6)
EOSINOPHIL NFR BLD: 4.8 %
GFR SERPLBLD CREATININE-BSD FMLA CKD-EPI: >90 ML/MIN/{1.73_M2}
GFR SERPLBLD CREATININE-BSD FMLA CKD-EPI: >90 ML/MIN/{1.73_M2}
GLUCOSE BLD-MCNC: 100 MG/DL (ref 70–99)
GLUCOSE BLD-MCNC: 88 MG/DL (ref 70–99)
GLUCOSE BLD-MCNC: 89 MG/DL (ref 70–99)
GLUCOSE BLD-MCNC: 96 MG/DL (ref 70–99)
GLUCOSE SERPL-MCNC: 102 MG/DL (ref 70–99)
GLUCOSE SERPL-MCNC: 97 MG/DL (ref 70–99)
HCT VFR BLD AUTO: 33.3 % (ref 36–48)
HGB BLD-MCNC: 11.8 G/DL (ref 12–16)
LYMPHOCYTES # BLD: 0.6 K/UL (ref 1–5.1)
LYMPHOCYTES NFR BLD: 13.5 %
MAGNESIUM SERPL-MCNC: 1.4 MG/DL (ref 1.8–2.4)
MCH RBC QN AUTO: 33.6 PG (ref 26–34)
MCHC RBC AUTO-ENTMCNC: 35.5 G/DL (ref 31–36)
MCV RBC AUTO: 94.7 FL (ref 80–100)
MONOCYTES # BLD: 0.9 K/UL (ref 0–1.3)
MONOCYTES NFR BLD: 18.9 %
NEUTROPHILS # BLD: 2.9 K/UL (ref 1.7–7.7)
NEUTROPHILS NFR BLD: 62.3 %
OSMOLALITY SERPL: 266 MOSM/KG (ref 278–305)
PERFORMED ON: ABNORMAL
PERFORMED ON: NORMAL
PHOSPHATE SERPL-MCNC: 1.5 MG/DL (ref 2.5–4.9)
PHOSPHATE SERPL-MCNC: 3.9 MG/DL (ref 2.5–4.9)
PLATELET # BLD AUTO: 125 K/UL (ref 135–450)
PMV BLD AUTO: 10 FL (ref 5–10.5)
POTASSIUM SERPL-SCNC: 4.1 MMOL/L (ref 3.5–5.1)
POTASSIUM SERPL-SCNC: 4.2 MMOL/L (ref 3.5–5.1)
RBC # BLD AUTO: 3.51 M/UL (ref 4–5.2)
SODIUM SERPL-SCNC: 124 MMOL/L (ref 136–145)
SODIUM SERPL-SCNC: 139 MMOL/L (ref 136–145)
TSH SERPL DL<=0.005 MIU/L-ACNC: 0.18 UIU/ML (ref 0.27–4.2)
WBC # BLD AUTO: 4.6 K/UL (ref 4–11)

## 2024-04-12 PROCEDURE — 6360000002 HC RX W HCPCS: Performed by: SURGERY

## 2024-04-12 PROCEDURE — 2580000003 HC RX 258: Performed by: INTERNAL MEDICINE

## 2024-04-12 PROCEDURE — 80069 RENAL FUNCTION PANEL: CPT

## 2024-04-12 PROCEDURE — 6370000000 HC RX 637 (ALT 250 FOR IP): Performed by: INTERNAL MEDICINE

## 2024-04-12 PROCEDURE — 85025 COMPLETE CBC W/AUTO DIFF WBC: CPT

## 2024-04-12 PROCEDURE — 6360000002 HC RX W HCPCS

## 2024-04-12 PROCEDURE — 83930 ASSAY OF BLOOD OSMOLALITY: CPT

## 2024-04-12 PROCEDURE — 1200000000 HC SEMI PRIVATE

## 2024-04-12 PROCEDURE — A4216 STERILE WATER/SALINE, 10 ML: HCPCS | Performed by: INTERNAL MEDICINE

## 2024-04-12 PROCEDURE — 6360000002 HC RX W HCPCS: Performed by: INTERNAL MEDICINE

## 2024-04-12 PROCEDURE — 2500000003 HC RX 250 WO HCPCS

## 2024-04-12 PROCEDURE — 6370000000 HC RX 637 (ALT 250 FOR IP): Performed by: STUDENT IN AN ORGANIZED HEALTH CARE EDUCATION/TRAINING PROGRAM

## 2024-04-12 PROCEDURE — 84443 ASSAY THYROID STIM HORMONE: CPT

## 2024-04-12 PROCEDURE — 83735 ASSAY OF MAGNESIUM: CPT

## 2024-04-12 PROCEDURE — 99231 SBSQ HOSP IP/OBS SF/LOW 25: CPT | Performed by: SURGERY

## 2024-04-12 PROCEDURE — 84439 ASSAY OF FREE THYROXINE: CPT

## 2024-04-12 PROCEDURE — 36591 DRAW BLOOD OFF VENOUS DEVICE: CPT

## 2024-04-12 PROCEDURE — 2580000003 HC RX 258

## 2024-04-12 PROCEDURE — C9113 INJ PANTOPRAZOLE SODIUM, VIA: HCPCS | Performed by: INTERNAL MEDICINE

## 2024-04-12 PROCEDURE — 6360000002 HC RX W HCPCS: Performed by: STUDENT IN AN ORGANIZED HEALTH CARE EDUCATION/TRAINING PROGRAM

## 2024-04-12 RX ORDER — TOLVAPTAN 15 MG/1
15 TABLET ORAL ONCE
Status: COMPLETED | OUTPATIENT
Start: 2024-04-12 | End: 2024-04-12

## 2024-04-12 RX ADMIN — DICYCLOMINE HYDROCHLORIDE 20 MG: 10 CAPSULE ORAL at 15:00

## 2024-04-12 RX ADMIN — POLYVINYL ALCOHOL 1 DROP: 14 SOLUTION/ DROPS OPHTHALMIC at 12:06

## 2024-04-12 RX ADMIN — DRONABINOL 5 MG: 5 CAPSULE ORAL at 21:02

## 2024-04-12 RX ADMIN — SODIUM CHLORIDE, PRESERVATIVE FREE 10 ML: 5 INJECTION INTRAVENOUS at 23:56

## 2024-04-12 RX ADMIN — POLYVINYL ALCOHOL 1 DROP: 14 SOLUTION/ DROPS OPHTHALMIC at 17:12

## 2024-04-12 RX ADMIN — SODIUM CHLORIDE, PRESERVATIVE FREE 10 ML: 5 INJECTION INTRAVENOUS at 21:02

## 2024-04-12 RX ADMIN — NYSTATIN 500000 UNITS: 100000 SUSPENSION ORAL at 09:29

## 2024-04-12 RX ADMIN — SODIUM CHLORIDE, PRESERVATIVE FREE 40 MG: 5 INJECTION INTRAVENOUS at 21:02

## 2024-04-12 RX ADMIN — Medication 40 MG: at 12:06

## 2024-04-12 RX ADMIN — DICYCLOMINE HYDROCHLORIDE 20 MG: 10 CAPSULE ORAL at 09:34

## 2024-04-12 RX ADMIN — SODIUM PHOSPHATE, MONOBASIC, MONOHYDRATE AND SODIUM PHOSPHATE, DIBASIC, ANHYDROUS 30 MMOL: 142; 276 INJECTION, SOLUTION INTRAVENOUS at 09:40

## 2024-04-12 RX ADMIN — DIBASIC SODIUM PHOSPHATE, MONOBASIC POTASSIUM PHOSPHATE AND MONOBASIC SODIUM PHOSPHATE 1 TABLET: 852; 155; 130 TABLET ORAL at 17:11

## 2024-04-12 RX ADMIN — TOLVAPTAN 15 MG: 15 TABLET ORAL at 11:45

## 2024-04-12 RX ADMIN — Medication 40 MG: at 21:06

## 2024-04-12 RX ADMIN — SODIUM CHLORIDE, PRESERVATIVE FREE 40 MG: 5 INJECTION INTRAVENOUS at 11:46

## 2024-04-12 RX ADMIN — METOCLOPRAMIDE HYDROCHLORIDE 5 MG: 5 INJECTION INTRAMUSCULAR; INTRAVENOUS at 04:21

## 2024-04-12 RX ADMIN — Medication 40 MG: at 09:29

## 2024-04-12 RX ADMIN — DICYCLOMINE HYDROCHLORIDE 20 MG: 10 CAPSULE ORAL at 21:02

## 2024-04-12 RX ADMIN — SODIUM CHLORIDE, PRESERVATIVE FREE 10 ML: 5 INJECTION INTRAVENOUS at 09:21

## 2024-04-12 RX ADMIN — DIBASIC SODIUM PHOSPHATE, MONOBASIC POTASSIUM PHOSPHATE AND MONOBASIC SODIUM PHOSPHATE 1 TABLET: 852; 155; 130 TABLET ORAL at 21:04

## 2024-04-12 RX ADMIN — METOCLOPRAMIDE HYDROCHLORIDE 5 MG: 5 INJECTION INTRAMUSCULAR; INTRAVENOUS at 11:48

## 2024-04-12 RX ADMIN — METOCLOPRAMIDE HYDROCHLORIDE 5 MG: 5 INJECTION INTRAMUSCULAR; INTRAVENOUS at 17:25

## 2024-04-12 RX ADMIN — NYSTATIN 500000 UNITS: 100000 SUSPENSION ORAL at 17:11

## 2024-04-12 RX ADMIN — NYSTATIN 500000 UNITS: 100000 SUSPENSION ORAL at 21:03

## 2024-04-12 RX ADMIN — DRONABINOL 5 MG: 5 CAPSULE ORAL at 10:40

## 2024-04-12 RX ADMIN — NYSTATIN 500000 UNITS: 100000 SUSPENSION ORAL at 12:19

## 2024-04-12 RX ADMIN — DONEPEZIL HYDROCHLORIDE 22.5 MG: 10 TABLET ORAL at 09:34

## 2024-04-12 RX ADMIN — ENOXAPARIN SODIUM 40 MG: 100 INJECTION SUBCUTANEOUS at 09:28

## 2024-04-12 RX ADMIN — SODIUM CHLORIDE, PRESERVATIVE FREE 10 ML: 5 INJECTION INTRAVENOUS at 23:37

## 2024-04-12 RX ADMIN — LORAZEPAM 0.5 MG: 0.5 TABLET ORAL at 09:34

## 2024-04-12 RX ADMIN — CARBAMAZEPINE 200 MG: 200 TABLET ORAL at 12:05

## 2024-04-12 RX ADMIN — CARBAMAZEPINE 200 MG: 200 TABLET ORAL at 08:00

## 2024-04-12 RX ADMIN — Medication 40 MG: at 17:12

## 2024-04-12 RX ADMIN — METOCLOPRAMIDE HYDROCHLORIDE 5 MG: 5 INJECTION INTRAMUSCULAR; INTRAVENOUS at 23:56

## 2024-04-12 RX ADMIN — POLYVINYL ALCOHOL 1 DROP: 14 SOLUTION/ DROPS OPHTHALMIC at 21:05

## 2024-04-12 RX ADMIN — DIBASIC SODIUM PHOSPHATE, MONOBASIC POTASSIUM PHOSPHATE AND MONOBASIC SODIUM PHOSPHATE 1 TABLET: 852; 155; 130 TABLET ORAL at 10:40

## 2024-04-12 RX ADMIN — TEMAZEPAM 15 MG: 15 CAPSULE ORAL at 23:32

## 2024-04-12 RX ADMIN — POLYVINYL ALCOHOL 1 DROP: 14 SOLUTION/ DROPS OPHTHALMIC at 09:30

## 2024-04-12 RX ADMIN — MAGNESIUM SULFATE HEPTAHYDRATE 6000 MG: 500 INJECTION, SOLUTION INTRAMUSCULAR; INTRAVENOUS at 09:27

## 2024-04-12 RX ADMIN — LORAZEPAM 0.5 MG: 0.5 TABLET ORAL at 21:02

## 2024-04-12 ASSESSMENT — PAIN DESCRIPTION - FREQUENCY
FREQUENCY: INTERMITTENT

## 2024-04-12 ASSESSMENT — PAIN - FUNCTIONAL ASSESSMENT
PAIN_FUNCTIONAL_ASSESSMENT: ACTIVITIES ARE NOT PREVENTED

## 2024-04-12 ASSESSMENT — PAIN DESCRIPTION - LOCATION
LOCATION: ABDOMEN;BACK;NECK
LOCATION: ABDOMEN;BACK;NECK
LOCATION: ABDOMEN;NECK;BACK

## 2024-04-12 ASSESSMENT — PAIN DESCRIPTION - ORIENTATION
ORIENTATION: RIGHT;LEFT;LOWER

## 2024-04-12 ASSESSMENT — PAIN SCALES - GENERAL
PAINLEVEL_OUTOF10: 4
PAINLEVEL_OUTOF10: 4
PAINLEVEL_OUTOF10: 0
PAINLEVEL_OUTOF10: 7
PAINLEVEL_OUTOF10: 4
PAINLEVEL_OUTOF10: 2
PAINLEVEL_OUTOF10: 3

## 2024-04-12 ASSESSMENT — PAIN DESCRIPTION - PAIN TYPE
TYPE: ACUTE PAIN

## 2024-04-12 ASSESSMENT — PAIN DESCRIPTION - ONSET
ONSET: ON-GOING
ONSET: ON-GOING
ONSET: GRADUAL

## 2024-04-12 ASSESSMENT — PAIN DESCRIPTION - DESCRIPTORS
DESCRIPTORS: ACHING;DISCOMFORT

## 2024-04-12 NOTE — CARE COORDINATION
LYNN following for discharge planning. Patient is from LTC at [facility] and has a paid bed hold. Patient plans to return to LTC once medically cleared for discharge. MD waiting on patient to tolerate diet before discharge. Patient has no questions at this time and is agreeable to return to LTC once discharged. LYNN following.     Electronically signed by RITU Wade on 4/12/2024 at 3:04 PM

## 2024-04-12 NOTE — PLAN OF CARE
Problem: Discharge Planning  Goal: Discharge to home or other facility with appropriate resources  4/12/2024 1316 by Romina Hinds RN  Outcome: Progressing    Problem: Pain  Goal: Verbalizes/displays adequate comfort level or baseline comfort level  4/12/2024 1316 by Romina Hinds RN  Outcome: Progressing    Problem: Safety - Adult  Goal: Free from fall injury  4/12/2024 1316 by Romina Hinds RN  Outcome: Progressing  Patient is a fall risk. Fall risk protocol in place as per fall risk score, see assessment for details. Bed is locked and in lowest possible position, side rails up x2, alarm in place and on, no slip footwear on, q2 hour turns. Call light/belongings within reach. Patient utilizes call light appropriately for assist as needed. Hourly rounds in place for safety, pt remains free from fall/injury. Will continue to monitor.       Problem: ABCDS Injury Assessment  Goal: Absence of physical injury  4/12/2024 1316 by Romina Hinds RN  Outcome: Progressing    Problem: Skin/Tissue Integrity  Goal: Absence of new skin breakdown  Description: 1.  Monitor for areas of redness and/or skin breakdown  2.  Assess vascular access sites hourly  3.  Every 4-6 hours minimum:  Change oxygen saturation probe site  4.  Every 4-6 hours:  If on nasal continuous positive airway pressure, respiratory therapy assess nares and determine need for appliance change or resting period.  4/12/2024 1316 by Romina Hinds RN  Outcome: Progressing    Problem: Nutrition Deficit:  Goal: Optimize nutritional status  4/12/2024 1316 by Romina Hinds RN  Outcome: Progressing

## 2024-04-12 NOTE — PROGRESS NOTES
Physician Progress Note      PATIENT:               OMAR MITCHELL  CSN #:                  [id number]  :                       1955  ADMIT DATE:       2024 7:33 PM  DISCH DATE:  RESPONDING  PROVIDER #:        Wilber Mcghee MD          QUERY TEXT:    Pt admitted with Nausea and abd pain. Pt noted to have delayed gastric   emptying and candidal esophagitis. If possible, please document in progress   notes and discharge summary if you are evaluating and /or treating any of the   following:      The medical record reflects the following:  Risk Factors: 69 y/o with delayed gastric emptying and Candida esophagitis  Clinical Indicators: GI  PN: \"Upper endoscopy showed Candida esophagitis   and nystatin treatments has not helped symptoms significantly.\"  4/10 PN:   \"Would start trial of metoclopramide for the possibility of delayed gastric   emptying.\"  Treatment: GI consult, Marinol, Nystatin, Reglan, antiemetics, Metoclopramide  Options provided:  -- Nausea and Vomiting due to Candidal esophagitis and delayed gastric   emptying  -- Nausea and vomiting due to Candidal Esophagitis  -- Nausea and Vomiting due to delayed gastric emptying  -- Other - I will add my own diagnosis  -- Disagree - Not applicable / Not valid  -- Disagree - Clinically unable to determine / Unknown  -- Refer to Clinical Documentation Reviewer    PROVIDER RESPONSE TEXT:    This patient has nausea and vomiting due to Candidal esophagitis and delayed   gastric emptying.    Query created by: Tonya Polk on 2024 1:48 PM      Electronically signed by:  Wilber Mcghee MD 2024 3:13 PM

## 2024-04-12 NOTE — PROGRESS NOTES
Progress Note    Admit Date: 4/4/2024    Diet: ADULT DIET; Full Liquid  ADULT ORAL NUTRITION SUPPLEMENT; Breakfast, Lunch, Dinner; Standard High Calorie/High Protein Oral Supplement    CC: Abdominal pain    Interval history:   Patient seen and examined at bedside, no acute complaints at this time.   There is no improvement in po intake despite on baseline CLD    Vitals however stable, no fevers.  Hyponatremia from SIADH, not improving on the labs, other electrolytes replaced    Medications:     Scheduled Meds:   magnesium sulfate  6,000 mg IntraVENous Once    sodium phosphate IVPB (PERIPHERAL line)  30 mmol IntraVENous Once    phosphorus  250 mg Oral 4x Daily    metoclopramide  5 mg IntraVENous Q6H    droNABinol  5 mg Oral BID    simethicone  40 mg Oral 4x Daily    [Held by provider] FLUoxetine  20 mg Oral Daily    temazepam  15 mg Oral Nightly    nystatin  5 mL Oral 4x Daily    lidocaine  1 patch TransDERmal Daily    pantoprazole (PROTONIX) 40 mg in sodium chloride (PF) 0.9 % 10 mL injection  40 mg IntraVENous Q12H    dicyclomine  20 mg Oral TID    sodium chloride flush  5-40 mL IntraVENous 2 times per day    enoxaparin  40 mg SubCUTAneous Daily    [Held by provider] carBAMazepine  200 mg Oral 4x Daily    donepezil  22.5 mg Oral Daily    LORazepam  0.5 mg Oral BID    polyvinyl alcohol  1 drop Ophthalmic 4x daily     Continuous Infusions:   sodium chloride       PRN Meds:haloperidol lactate, sodium chloride flush, sodium chloride, polyethylene glycol, acetaminophen **OR** acetaminophen, [DISCONTINUED] promethazine **OR** ondansetron, promethazine, oxyCODONE    Objective:   Vitals:   T-max:  Patient Vitals for the past 8 hrs:   BP Temp Temp src Pulse Resp SpO2   04/12/24 0915 118/81 98.1 °F (36.7 °C) Oral 76 14 95 %       Intake/Output Summary (Last 24 hours) at 4/12/2024 1421  Last data filed at 4/12/2024 1208  Gross per 24 hour   Intake --   Output 1750 ml   Net -1750 ml       Review of Systems    Physical

## 2024-04-12 NOTE — PROGRESS NOTES
Surgery Daily Progress Note      CC: Nausea, abdominal pain    SUBJECTIVE:  Reports improved nausea and appetite this morning.  Continues to have ostomy output.    ROS:   A 14 point review of systems was conducted, significant findings as noted above. All other systems negative.      OBJECTIVE:    PHYSICAL EXAM:  Vitals:    04/11/24 1940 04/11/24 2049 04/12/24 0424 04/12/24 0915   BP: (!) 94/51 (!) 103/54 (!) 103/58 118/81   Pulse: 86 83 77 76   Resp: 18  16 14   Temp: 98.3 °F (36.8 °C)  97.9 °F (36.6 °C) 98.1 °F (36.7 °C)   TempSrc: Oral  Oral Oral   SpO2: 97%  96% 95%   Weight:       Height:           General appearance: Alert, no acute distress  HEENT: No scleral icterus, EOM grossly intact  Neck: Trachea midline, no JVD  Chest/Lungs:  normal effort with no accessory muscle use on RA  Cardiovascular: RRR, well perfused  Abdomen:  soft, presence of ileostomy with gas and brown stool in bag, non tender around parastomal area, non distended   Skin: Warm and dry, no rashes  Extremities: No edema, no cyanosis  Neuro: A&Ox3, no focal deficits, sensation intact      ASSESSMENT & PLAN:   Liana Salmeron is a 68 y.o. female with complex medical history including chronic constipation with prior total abdominal colectomy with subsequent ileostomy creation.    - No acute surgical intervention  - Marinol, Reglan per GI recommendations  - Continue empiric nystatin  - Continue diet advancement as tolerated per primary  - Please contact general surgery with any further questions/concerns    Maikel Cisneros IV, MD  General Surgery, PGY-3  04/12/24  11:57 AM  [phone]

## 2024-04-12 NOTE — CONSULTS
Daily  lidocaine 4 % external patch 1 patch, 1 patch, TransDERmal, Daily  pantoprazole (PROTONIX) 40 mg in sodium chloride (PF) 0.9 % 10 mL injection, 40 mg, IntraVENous, Q12H  dicyclomine (BENTYL) capsule 20 mg, 20 mg, Oral, TID  sodium chloride flush 0.9 % injection 5-40 mL, 5-40 mL, IntraVENous, 2 times per day  sodium chloride flush 0.9 % injection 5-40 mL, 5-40 mL, IntraVENous, PRN  0.9 % sodium chloride infusion, , IntraVENous, PRN  enoxaparin (LOVENOX) injection 40 mg, 40 mg, SubCUTAneous, Daily  polyethylene glycol (GLYCOLAX) packet 17 g, 17 g, Oral, Daily PRN  acetaminophen (TYLENOL) tablet 650 mg, 650 mg, Oral, Q6H PRN **OR** acetaminophen (TYLENOL) suppository 650 mg, 650 mg, Rectal, Q6H PRN  [DISCONTINUED] promethazine (PHENERGAN) tablet 25 mg, 25 mg, Oral, Q6H PRN **OR** ondansetron (ZOFRAN) injection 4 mg, 4 mg, IntraVENous, Q6H PRN  promethazine (PHENERGAN) injection 25 mg, 25 mg, IntraMUSCular, Q4H PRN  carBAMazepine (TEGRETOL) tablet 200 mg, 200 mg, Oral, 4x Daily  donepezil (ARICEPT) tablet 22.5 mg, 22.5 mg, Oral, Daily  LORazepam (ATIVAN) tablet 0.5 mg, 0.5 mg, Oral, BID  oxyCODONE (ROXICODONE) immediate release tablet 5 mg, 5 mg, Oral, Q6H PRN  polyvinyl alcohol (LIQUIFILM TEARS) 1.4 % ophthalmic solution 1 drop, 1 drop, Ophthalmic, 4x daily    Vitals :     Vitals:    04/12/24 0915   BP: 118/81   Pulse: 76   Resp: 14   Temp: 98.1 °F (36.7 °C)   SpO2: 95%          Physical Examination :     appearance: Alert, orientated  Respiratory: no distress  Cardiovascular: no visibly  raised JVD, Edema trace  Abdomen: -  soft  Other relevant findings: Ileostomy bag is in place.     Labs :     CBC:   Recent Labs     04/10/24  0354 04/11/24  0429 04/12/24  0415   WBC 4.7 4.3 4.6   HGB 13.1 12.5 11.8*   HCT 37.2 35.6* 33.3*   * 127* 125*     BMP:    Recent Labs     04/10/24  0354 04/11/24  0429 04/12/24  0415   * 124* 124*   K 3.9 3.7 4.1   CL 91* 91* 91*   CO2 29 26 30   BUN <2* <2* 5*    CREATININE <0.5* <0.5* <0.5*   GLUCOSE 93 89 97   MG 1.50* 1.90 1.40*   PHOS 2.3* 2.9 1.5*     Lab Results   Component Value Date/Time    COLORU Yellow 04/04/2024 08:12 PM    NITRU POSITIVE 04/04/2024 08:12 PM    GLUCOSEU Negative 04/04/2024 08:12 PM    KETUA >=80 04/04/2024 08:12 PM    UROBILINOGEN 0.2 04/04/2024 08:12 PM    BILIRUBINUR Negative 04/04/2024 08:12 PM        ----------------------------------------------------------  Please call with questions at      24 Hrs Answering service [phone]  Perfect serve, or cell phone 7 am - 5pm  Balwinder Romero MD   [facility]

## 2024-04-12 NOTE — PROGRESS NOTES
Palliative Care Chart Review  and Check in Note:     NAME:  Liana Salmeron  Admit Date: 4/4/2024  Hospital Day:  Hospital Day: 9   Current Code status: DNR-CCA    Palliative care is continuing to following Ms. Salmeron for symptom management,  and goals of care discussion as needed. Patient's chart reviewed today 4/12/24.      Pt was sleeping on both my attempts to check on her today. D/w RN, her symptoms are improved today. Did not disturb pt.    The following are the currently established goals/code status, and Symptom management.     Goals of care: Pt hopes to maintain her quality of life, which she feels is good, at [facility].    Code status: DNR-CCA    Discharge plan: return to [facility] when medically ready    Cuca Arrieta NP  Palliative Care  [phone]

## 2024-04-12 NOTE — PROGRESS NOTES
Progress Note    Patient Liana Salmeron  MRN: [id number]  YOB: 1955 Age: 68 y.o. Sex: female  Room: [address] [place] [address]       Admitting Physician: Mario Rodriguez MD   Date of Admission: 4/4/2024  7:33 PM   Primary Care Physician: Tristan Santiago MD     Subjective:  Liana Salmeron was seen and examined. We are following for nausea and a poor appetite.  -- Patient reports improvement in nausea and oral intake.    ROS:  Constitutional: Denies fever, no change in appetite  Respiratory: Denies cough or shortness of breath  Cardiovascular: Denies chest pain or edema    Objective:  Vital Signs:   Vitals:    04/12/24 0915   BP: 118/81   Pulse: 76   Resp: 14   Temp: 98.1 °F (36.7 °C)   SpO2: 95%         Physical Exam:  Constitutional: Alert and oriented x 4. No acute distress.   HEENT: Sclera anicteric, mucosal membranes moist  Cardiovascular: Regular rate and rhythm.  No murmurs.  Respiratory: Respirations nonlabored, no crepitus  GI: Abdomen nondistended, soft, and mildly tender in left abdomen.  Normal active bowel sounds.  No masses palpable.  Ostomy with stool in right upper quadrant.  Rectal: Deferred  Musculoskeletal:  No pitting edema of the lower legs.  Neurological: Gross memory appears intact.  no Asterixis    Intake/Output:    Intake/Output Summary (Last 24 hours) at 4/12/2024 1517  Last data filed at 4/12/2024 1208  Gross per 24 hour   Intake --   Output 1750 ml   Net -1750 ml          Current Medications:  Current Facility-Administered Medications   Medication Dose Route Frequency Provider Last Rate Last Admin    magnesium sulfate 6,000 mg in sodium chloride 0.9 % 250 mL IVPB  6,000 mg IntraVENous Once Ashlyn Hollingsworth MD 41.7 mL/hr at 04/12/24 0927 6,000 mg at 04/12/24 0927    sodium phosphate 30 mmol in sodium chloride 0.9 % 500 mL IVPB  30 mmol IntraVENous Once Ashlyn Hollingsworth MD 83.3 mL/hr at 04/12/24 0940 30 mmol at 04/12/24 0940    phosphorus (K PHOS NEUTRAL) 1 tablet  250 mg

## 2024-04-13 VITALS
WEIGHT: 125.66 LBS | HEART RATE: 84 BPM | TEMPERATURE: 98.2 F | DIASTOLIC BLOOD PRESSURE: 60 MMHG | OXYGEN SATURATION: 98 % | BODY MASS INDEX: 22.27 KG/M2 | SYSTOLIC BLOOD PRESSURE: 105 MMHG | HEIGHT: 63 IN | RESPIRATION RATE: 20 BRPM

## 2024-04-13 PROBLEM — Z71.89 ADVANCE CARE PLANNING: Status: RESOLVED | Noted: 2024-04-09 | Resolved: 2024-04-13

## 2024-04-13 PROBLEM — R10.9 ABDOMINAL PAIN: Status: RESOLVED | Noted: 2024-04-07 | Resolved: 2024-04-13

## 2024-04-13 PROBLEM — N39.0 UTI (URINARY TRACT INFECTION): Status: RESOLVED | Noted: 2024-04-05 | Resolved: 2024-04-13

## 2024-04-13 PROBLEM — R11.2 NAUSEA AND VOMITING: Status: RESOLVED | Noted: 2024-04-10 | Resolved: 2024-04-13

## 2024-04-13 PROBLEM — Z51.5 ENCOUNTER FOR PALLIATIVE CARE: Status: RESOLVED | Noted: 2024-04-09 | Resolved: 2024-04-13

## 2024-04-13 LAB
ALBUMIN SERPL-MCNC: 2.8 G/DL (ref 3.4–5)
ANION GAP SERPL CALCULATED.3IONS-SCNC: 6 MMOL/L (ref 3–16)
BASOPHILS # BLD: 0 K/UL (ref 0–0.2)
BASOPHILS NFR BLD: 0.4 %
BUN SERPL-MCNC: 4 MG/DL (ref 7–20)
CALCIUM SERPL-MCNC: 7.6 MG/DL (ref 8.3–10.6)
CHLORIDE SERPL-SCNC: 100 MMOL/L (ref 99–110)
CO2 SERPL-SCNC: 30 MMOL/L (ref 21–32)
CREAT SERPL-MCNC: <0.5 MG/DL (ref 0.6–1.2)
DEPRECATED RDW RBC AUTO: 12.9 % (ref 12.4–15.4)
EOSINOPHIL # BLD: 0.1 K/UL (ref 0–0.6)
EOSINOPHIL NFR BLD: 2.5 %
GFR SERPLBLD CREATININE-BSD FMLA CKD-EPI: >90 ML/MIN/{1.73_M2}
GLUCOSE SERPL-MCNC: 87 MG/DL (ref 70–99)
HCT VFR BLD AUTO: 36.6 % (ref 36–48)
HGB BLD-MCNC: 12.6 G/DL (ref 12–16)
LYMPHOCYTES # BLD: 0.6 K/UL (ref 1–5.1)
LYMPHOCYTES NFR BLD: 9.5 %
MAGNESIUM SERPL-MCNC: 2.1 MG/DL (ref 1.8–2.4)
MCH RBC QN AUTO: 33.6 PG (ref 26–34)
MCHC RBC AUTO-ENTMCNC: 34.4 G/DL (ref 31–36)
MCV RBC AUTO: 97.8 FL (ref 80–100)
MONOCYTES # BLD: 1.2 K/UL (ref 0–1.3)
MONOCYTES NFR BLD: 20.5 %
NEUTROPHILS # BLD: 3.9 K/UL (ref 1.7–7.7)
NEUTROPHILS NFR BLD: 67.1 %
PHOSPHATE SERPL-MCNC: 2.9 MG/DL (ref 2.5–4.9)
PLATELET # BLD AUTO: 135 K/UL (ref 135–450)
PMV BLD AUTO: 9.9 FL (ref 5–10.5)
POTASSIUM SERPL-SCNC: 4 MMOL/L (ref 3.5–5.1)
RBC # BLD AUTO: 3.75 M/UL (ref 4–5.2)
SODIUM SERPL-SCNC: 136 MMOL/L (ref 136–145)
SODIUM SERPL-SCNC: 138 MMOL/L (ref 136–145)
WBC # BLD AUTO: 5.8 K/UL (ref 4–11)

## 2024-04-13 PROCEDURE — 85025 COMPLETE CBC W/AUTO DIFF WBC: CPT

## 2024-04-13 PROCEDURE — 6370000000 HC RX 637 (ALT 250 FOR IP): Performed by: INTERNAL MEDICINE

## 2024-04-13 PROCEDURE — 6360000002 HC RX W HCPCS: Performed by: INTERNAL MEDICINE

## 2024-04-13 PROCEDURE — 6360000002 HC RX W HCPCS: Performed by: STUDENT IN AN ORGANIZED HEALTH CARE EDUCATION/TRAINING PROGRAM

## 2024-04-13 PROCEDURE — 2580000003 HC RX 258: Performed by: INTERNAL MEDICINE

## 2024-04-13 PROCEDURE — 80069 RENAL FUNCTION PANEL: CPT

## 2024-04-13 PROCEDURE — 36591 DRAW BLOOD OFF VENOUS DEVICE: CPT

## 2024-04-13 PROCEDURE — 84134 ASSAY OF PREALBUMIN: CPT

## 2024-04-13 PROCEDURE — 6360000002 HC RX W HCPCS: Performed by: SURGERY

## 2024-04-13 PROCEDURE — 83735 ASSAY OF MAGNESIUM: CPT

## 2024-04-13 PROCEDURE — A4216 STERILE WATER/SALINE, 10 ML: HCPCS | Performed by: INTERNAL MEDICINE

## 2024-04-13 PROCEDURE — C9113 INJ PANTOPRAZOLE SODIUM, VIA: HCPCS | Performed by: INTERNAL MEDICINE

## 2024-04-13 PROCEDURE — 6370000000 HC RX 637 (ALT 250 FOR IP): Performed by: NURSE PRACTITIONER

## 2024-04-13 PROCEDURE — 6370000000 HC RX 637 (ALT 250 FOR IP): Performed by: STUDENT IN AN ORGANIZED HEALTH CARE EDUCATION/TRAINING PROGRAM

## 2024-04-13 PROCEDURE — 84295 ASSAY OF SERUM SODIUM: CPT

## 2024-04-13 RX ORDER — DRONABINOL 5 MG/1
5 CAPSULE ORAL 2 TIMES DAILY
Qty: 60 CAPSULE | Refills: 0 | Status: SHIPPED | OUTPATIENT
Start: 2024-04-13 | End: 2024-05-13

## 2024-04-13 RX ORDER — DEXTROSE MONOHYDRATE 50 MG/ML
INJECTION, SOLUTION INTRAVENOUS CONTINUOUS
Status: DISCONTINUED | OUTPATIENT
Start: 2024-04-13 | End: 2024-04-13 | Stop reason: HOSPADM

## 2024-04-13 RX ORDER — METOCLOPRAMIDE 5 MG/1
5 TABLET ORAL 3 TIMES DAILY PRN
Qty: 15 TABLET | Refills: 0 | Status: SHIPPED | OUTPATIENT
Start: 2024-04-13 | End: 2024-04-18

## 2024-04-13 RX ADMIN — ENOXAPARIN SODIUM 40 MG: 100 INJECTION SUBCUTANEOUS at 08:06

## 2024-04-13 RX ADMIN — Medication 40 MG: at 08:08

## 2024-04-13 RX ADMIN — NYSTATIN 500000 UNITS: 100000 SUSPENSION ORAL at 08:03

## 2024-04-13 RX ADMIN — METOCLOPRAMIDE HYDROCHLORIDE 5 MG: 5 INJECTION INTRAMUSCULAR; INTRAVENOUS at 06:10

## 2024-04-13 RX ADMIN — DIBASIC SODIUM PHOSPHATE, MONOBASIC POTASSIUM PHOSPHATE AND MONOBASIC SODIUM PHOSPHATE 1 TABLET: 852; 155; 130 TABLET ORAL at 08:04

## 2024-04-13 RX ADMIN — SODIUM CHLORIDE, PRESERVATIVE FREE 10 ML: 5 INJECTION INTRAVENOUS at 08:01

## 2024-04-13 RX ADMIN — SODIUM CHLORIDE, PRESERVATIVE FREE 40 MG: 5 INJECTION INTRAVENOUS at 08:00

## 2024-04-13 RX ADMIN — DICYCLOMINE HYDROCHLORIDE 20 MG: 10 CAPSULE ORAL at 13:48

## 2024-04-13 RX ADMIN — POLYVINYL ALCOHOL 1 DROP: 14 SOLUTION/ DROPS OPHTHALMIC at 13:57

## 2024-04-13 RX ADMIN — Medication 40 MG: at 13:57

## 2024-04-13 RX ADMIN — DRONABINOL 5 MG: 5 CAPSULE ORAL at 11:30

## 2024-04-13 RX ADMIN — BENZOCAINE 6 MG-MENTHOL 10 MG LOZENGES 1 LOZENGE: at 00:01

## 2024-04-13 RX ADMIN — DONEPEZIL HYDROCHLORIDE 22.5 MG: 10 TABLET ORAL at 08:03

## 2024-04-13 RX ADMIN — DIBASIC SODIUM PHOSPHATE, MONOBASIC POTASSIUM PHOSPHATE AND MONOBASIC SODIUM PHOSPHATE 1 TABLET: 852; 155; 130 TABLET ORAL at 13:55

## 2024-04-13 RX ADMIN — POLYVINYL ALCOHOL 1 DROP: 14 SOLUTION/ DROPS OPHTHALMIC at 08:07

## 2024-04-13 RX ADMIN — DICYCLOMINE HYDROCHLORIDE 20 MG: 10 CAPSULE ORAL at 08:03

## 2024-04-13 RX ADMIN — NYSTATIN 500000 UNITS: 100000 SUSPENSION ORAL at 13:47

## 2024-04-13 RX ADMIN — METOCLOPRAMIDE HYDROCHLORIDE 5 MG: 5 INJECTION INTRAMUSCULAR; INTRAVENOUS at 13:47

## 2024-04-13 RX ADMIN — SODIUM CHLORIDE, PRESERVATIVE FREE 10 ML: 5 INJECTION INTRAVENOUS at 06:10

## 2024-04-13 RX ADMIN — LORAZEPAM 0.5 MG: 0.5 TABLET ORAL at 08:03

## 2024-04-13 ASSESSMENT — PAIN DESCRIPTION - PAIN TYPE
TYPE: ACUTE PAIN
TYPE: ACUTE PAIN

## 2024-04-13 ASSESSMENT — PAIN DESCRIPTION - ONSET
ONSET: ON-GOING
ONSET: ON-GOING

## 2024-04-13 ASSESSMENT — PAIN SCALES - GENERAL
PAINLEVEL_OUTOF10: 0
PAINLEVEL_OUTOF10: 0
PAINLEVEL_OUTOF10: 3
PAINLEVEL_OUTOF10: 0
PAINLEVEL_OUTOF10: 4
PAINLEVEL_OUTOF10: 3

## 2024-04-13 ASSESSMENT — PAIN DESCRIPTION - LOCATION
LOCATION: ABDOMEN;NECK
LOCATION: THROAT
LOCATION: ABDOMEN;NECK;BACK

## 2024-04-13 ASSESSMENT — PAIN DESCRIPTION - ORIENTATION
ORIENTATION: RIGHT;LEFT;LOWER
ORIENTATION: MID

## 2024-04-13 ASSESSMENT — PAIN DESCRIPTION - DESCRIPTORS
DESCRIPTORS: ACHING;DISCOMFORT
DESCRIPTORS: SORE

## 2024-04-13 ASSESSMENT — PAIN DESCRIPTION - FREQUENCY
FREQUENCY: INTERMITTENT
FREQUENCY: INTERMITTENT

## 2024-04-13 NOTE — PROGRESS NOTES
Pt discharged back to SNF via transport. Belongings sent w pt. Attempted to call report/no answer. Port [place] accessed.

## 2024-04-13 NOTE — CARE COORDINATION
Case Management Assessment            Discharge Note                    Date / Time of Note: 4/13/2024 12:41 PM                  Discharge Note Completed by: Cata Barlow RN    Patient Name: Liana Salmeron   YOB: 1955  Diagnosis: Hypokalemia [E87.6]  Hypomagnesemia [E83.42]  UTI (urinary tract infection) [N39.0]  Abdominal pain, unspecified abdominal location [R10.9]   Date / Time: 4/4/2024  7:33 PM    Current PCP: Tristan Santiago MD  Clinic patient: No    Hospitalization in the last 30 days: No       Advance Directives:  Code Status: DNR-[facility] DNR form completed and on chart: Yes    Financial:  Payor: MEDICARE / Plan: MEDICARE PART A AND B / Product Type: *No Product type* /      Pharmacy:    [facility]/pharmacy #[id number] - [place], [place] - [address] - P [phone] - F [phone]  [address] [place] [address]  Phone: [phone] Fax: [phone]      Assistance purchasing medications?: Potential Assistance Purchasing Medications: No  Assistance provided by Case Management: None at this time    Does patient want to participate in local refill/ meds to beds program?: No    Meds To Beds General Rules:  1. Can ONLY be done Monday- Friday between 8:30am-5pm  2. Prescription(s) must be in pharmacy by 3pm to be filled same day  3.Copy of patient's insurance/ prescription drug card and patient face sheet must be sent along with the prescription(s)  4. Cost of Rx cannot be added to hospital bill. If financial assistance is needed, please contact unit  or ;  or  CANNOT provide pharmacy voucher for patients co-pays  5. Patients can then  the prescription on their way out of the hospital at discharge, or pharmacy can deliver to the bedside if staff is available. (payment due at time of pick-up or delivery - cash, check, or card accepted)     Able to afford home medications/ co-pay costs: Yes    ADLS:  Current PT

## 2024-04-13 NOTE — PROGRESS NOTES
Patient is A&O x4.  RA, sat 95%.  Patient c/o intermittent dizzy that she thinks is from the marinol which is helping her nausea.    No complaints of SOB.  Medicated for c/o neck, back, and abdomen pain as needed.    Vital signs stable as charted.  Respirations appear to easy and unlabored.  Lungs clear but diminished.  Respirations easy with no complaints of cough.  No complaints of nausea at this time.  Up with lift to the chair as needed.  Incontinent or urine, pure wick in place.  Left chest wall PAC intact and flushed with a brisk blood return noted.  Tolerating regular diet.  Redness to bottom.  Ileostomy intact with brown liquid stool.    Plan of care and safety measures reviewed with the patient.  Call light in reach and bed alarm in place.  Bed attached to wall for alarm purposes.  Will continue to monitor.  Electronically signed by Tracey Palencia RN on 4/12/2024 at 10:16 PM

## 2024-04-13 NOTE — PLAN OF CARE
Problem: Discharge Planning  Goal: Discharge to home or other facility with appropriate resources  Outcome: Progressing     Problem: Pain  Goal: Verbalizes/displays adequate comfort level or baseline comfort level  4/13/2024 0730 by Bea Gorman RN  Pain assessed q4 hrs and PRN using the 0-10 pain scale. Pain goal reviewed with patient. PRN pain medications given as needed and comfort provided non-pharmacologically (i.e. Repositioning). Patient instructed to report pain to RN.     Problem: Safety - Adult  Goal: Free from fall injury  4/13/2024 0730 by Bea Gorman RN  Outcome: Progressing  Pt is a fall risk. Fall risk protocol in place. See Mishra Fall Score. Pt bed is in low position, bed alarm is on, side rails up, fall risk bracelet applied., non-skid footwear in use. Patient/family educated on fall risk protocol, instructed to call for assistance when needed and belongings are in reach.assistance. Will continue with hourly rounds for po intake, pain needs, toileting and repositioning as needed. Will continue to monitor for needs.     Problem: ABCDS Injury Assessment  Goal: Absence of physical injury  Outcome: Progressing     Problem: Skin/Tissue Integrity  Goal: Absence of new skin breakdown  Description: 1.  Monitor for areas of redness and/or skin breakdown  2.  Assess vascular access sites hourly  3.  Every 4-6 hours minimum:  Change oxygen saturation probe site  4.  Every 4-6 hours:  If on nasal continuous positive airway pressure, respiratory therapy assess nares and determine need for appliance change or resting period.  Outcome: Progressing

## 2024-04-13 NOTE — DISCHARGE SUMMARY
V2.0  Discharge Summary    Name:  Liana Salmeron /Age/Sex: 1955 (68 y.o. 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  Serum osmolality 260, urine osmolality 326, urine sodium 77  Fluid restriction to 1200ml/day  S/p one dose of Tolvaptan 15 mg with subsequent improvement in sodium  Discussed with Dr. Colbert from nephrology  Recommends continuing to hold Tegretol and Prozac for now  Recommended using Wellbutrin however patient is not interested at the moment  Patient would like to reintroduce Prozac/Tegretol as an outpatient  Defer to PCP after discharge  Recommend rechecking BMP in 2 weeks and decide accordingly based on sodium levels  Outpatient follow-up with nephrology     Abdominal pain in a patient with multiple abdominal surgeries  Chronic abdominal pain  Patient came in with abdominal pain, and decreased oral intake.  CT abdomen pelvis showed no signs of bowel obstruction but did show multiple bladder stones.   General surgery consulted and recommended no intervention.  Patient was evaluated by gastroenterology and had an EGD on 2024, white plaques were found in the

## 2024-04-13 NOTE — CONSULTS
Ph: [phone], Fax: [phone]           [facility]               Reason for admission:                 Abdominal pain.    Brief Summary :     Liana Salmeron is being seen by nephrology for hyponatremia.      Interval History and plan:      Blood pressure is relatively low 105/60.  Urine output is 3400 due to Samsca.    Replace magnesium and phosphorus.  Workup is suggestive of SIADH.  She is on Tegretol and SSRI which can cause high ADH.  It is complicated by poor nourishment.  Na is now 136 after Samsca and off IV fluid.  Rapid rise from 124 to 139 and now trending back down. Goal is 6-8 mEq/L/24 hrs  Ordered D5 100 ml/hr x 5 hrs.   Ser Osm 266  Will follow Na levels every 8 hrs                     Assessment :     Chronic hyponatremia: He has intermittent hyponatremia at least since 2014.  On arrival sodium is 133 and is currently 124.     She has low albumin of 2.6.  Prealbumin is also low.  LFTs are normal.  Cortisol in the past was 12.7.  Serum osmolality is 260.  Urine osmolality 326 with sodium of 77.  At home she is on Prozac, Tegretol  She has poor nourishment and phosphorus of 1.5, magnesium was 1.4 and albumin of 2.6.    Poor nourishment: Patient is getting supplements as well as Marinol.      [facility] would like to thank Taz Merino MD   for opportunity to serve this patient      Please call with questions at-   24 Hrs Answering service [phone] or  7 am- 5 pm via Perfect serve or cell phone  Dr.Ian LIAT Colbert, DO       HPI :     Liana Salmeron is a 68 y.o. female presented to   the hospital on 4/4/2024 with abdominal pain    She has past medical history of PTSD, GERD, history of bowel obstruction requiring colectomy with ileostomy.  Multiple admissions due to bowel obstruction.  She was not eating and drinking well due to abdominal pain.  When she arrived in the ER vitals were stable.  He was found to be hypoglycemic with ketoacidosis.    She is on    MG 1.90 1.40*  --   --  2.10   PHOS 2.9 1.5* 3.9  --  2.9       Lab Results   Component Value Date/Time    COLORU Yellow 04/04/2024 08:12 PM    NITRU POSITIVE 04/04/2024 08:12 PM    GLUCOSEU Negative 04/04/2024 08:12 PM    KETUA >=80 04/04/2024 08:12 PM    UROBILINOGEN 0.2 04/04/2024 08:12 PM    BILIRUBINUR Negative 04/04/2024 08:12 PM        ----------------------------------------------------------  Please call with questions at      24 Hrs Answering service [phone]  Perfect serve, or cell phone 7 am - 5pm  Balwinder Romero MD   [facility] CenterjoseNov[facility] [email or web address]

## 2024-04-13 NOTE — PROGRESS NOTES
After reviewing labs noted that NA level on 4/12 at 0445 was 124 and at 1741 at was 139.  Samsca given today.  Patient is completely asymptomatic with higher sodium level.  Will continue to monitor closely and report off to day shift RN.  Will notify provider if patient becomes symptomatic with current NA level.  No needs at this time.  Patient resting comfortably in bed.  Call light in reach.  Bed alarm on.  Will continue to monitor.   Electronically signed by Tracey Palencia RN on 4/13/2024 at 12:43 AM

## 2024-04-13 NOTE — PLAN OF CARE
Pt free from falls this shift. Fall precautions in place at all times. Call light always withinreach. Pt able and agreeable to contact for safety appropriately. Bed alarm on.    Pain/discomfort being managed with PRN analgesics per MD orders. Pt able to express presence and absence of pain and rate pain appropriately using numerical scale.

## 2024-04-14 LAB — T4 FREE SERPL-MCNC: 0.7 NG/DL (ref 0.9–1.8)

## 2024-04-17 LAB — PREALB SERPL-MCNC: 9.8 MG/DL (ref 20–40)

## 2024-07-06 NOTE — ANESTHESIA POSTPROCEDURE EVALUATION
Department of Anesthesiology  Postprocedure Note    Patient: Liana Salmeron  MRN: [id number]  YOB: 1955  Date of evaluation: 4/8/2024    Procedure Summary       Date: 04/08/24 Room / Location: [place] [address] / [facility]    Anesthesia Start: 1434 Anesthesia Stop: 1444    Procedure: ESOPHAGOGASTRODUODENOSCOPY BIOPSY Diagnosis:       Nausea and vomiting, unspecified vomiting type      (Nausea and vomiting, unspecified vomiting type [R11.2])    Surgeons: Collin Vance MD Responsible Provider: Robin Pisano MD    Anesthesia Type: MAC ASA Status: 3            Anesthesia Type: No value filed.    Lorena Phase I: Lorena Score: 10    Lorena Phase II: Lorena Score: 10    Anesthesia Post Evaluation    Patient location during evaluation: PACU  Patient participation: complete - patient participated  Level of consciousness: awake and alert  Pain score: 0  Airway patency: patent  Nausea & Vomiting: no nausea and no vomiting  Cardiovascular status: hemodynamically stable  Respiratory status: acceptable  Hydration status: euvolemic  Pain management: adequate    No notable events documented.   Admitted

## (undated) DEVICE — ST FLUFF LG 1 PLY: Brand: DEROYAL

## (undated) DEVICE — PENCIL ES ULT VAC W TELSCP NOSE EZ CLN BLDE 10FT

## (undated) DEVICE — PLATE ES AD W 9FT CRD 2

## (undated) DEVICE — FORCEPS BX L240CM WRK CHN 2.8MM STD CAP W/ NDL MIC MESH

## (undated) DEVICE — TOWEL,OR,DSP,ST,BLUE,DLX,8/PK,10PK/CS: Brand: MEDLINE

## (undated) DEVICE — SYRINGE IRRIG 60ML SFT PLIABLE BLB EZ TO GRP 1 HND USE W/

## (undated) DEVICE — SHEET,T,THYROID,STERILE: Brand: MEDLINE

## (undated) DEVICE — COVER LT HNDL BLU PLAS

## (undated) DEVICE — GLOVE SURG SZ 7 CRM LTX FREE POLYISOPRENE POLYMER BEAD ANTI

## (undated) DEVICE — GARMENT,MEDLINE,DVT,INT,CALF,MED, GEN2: Brand: MEDLINE

## (undated) DEVICE — TUBING, SUCTION, 1/4" X 12', STRAIGHT: Brand: MEDLINE

## (undated) DEVICE — UNDERPANTS INCONT XL 45-70IN KNIT SEAMLESS DSGN COLOR-CODED

## (undated) DEVICE — PAD,ABDOMINAL,5"X9",ST,LF,25/BX: Brand: MEDLINE INDUSTRIES, INC.

## (undated) DEVICE — SURE SET-DOUBLE BASIN-LF: Brand: MEDLINE INDUSTRIES, INC.

## (undated) DEVICE — GOWN,SIRUS,POLYRNF,BRTHSLV,XL,30/CS: Brand: MEDLINE

## (undated) DEVICE — TRAY PREP DRY W/ PREM GLV 2 APPL 6 SPNG 2 UNDPD 1 OVERWRAP

## (undated) DEVICE — SURGICAL SET UP - SURE SET: Brand: MEDLINE INDUSTRIES, INC.

## (undated) DEVICE — STANDARD HYPODERMIC NEEDLE,POLYPROPYLENE HUB: Brand: MONOJECT

## (undated) DEVICE — JEWISH HOSPITAL TURNOVER KIT: Brand: MEDLINE INDUSTRIES, INC.